# Patient Record
Sex: MALE | Race: WHITE | NOT HISPANIC OR LATINO | Employment: FULL TIME | URBAN - METROPOLITAN AREA
[De-identification: names, ages, dates, MRNs, and addresses within clinical notes are randomized per-mention and may not be internally consistent; named-entity substitution may affect disease eponyms.]

---

## 2018-03-22 LAB
ABSOL LYMPHOCYTES (HISTORICAL): 2.2 K/UL (ref 0.5–4)
ALBUMIN SERPL BCP-MCNC: 4.5 G/DL (ref 3–5.2)
ALP SERPL-CCNC: 62 U/L (ref 43–122)
ALT SERPL W P-5'-P-CCNC: 125 U/L (ref 9–52)
ANION GAP SERPL CALCULATED.3IONS-SCNC: 11 MMOL/L (ref 5–14)
AST SERPL W P-5'-P-CCNC: 58 U/L (ref 17–59)
BASOPHILS # BLD AUTO: 0 K/UL (ref 0–0.1)
BASOPHILS # BLD AUTO: 1 % (ref 0–1)
BILIRUB SERPL-MCNC: 1 MG/DL
BUN SERPL-MCNC: 15 MG/DL (ref 5–25)
CALCIUM SERPL-MCNC: 9.6 MG/DL (ref 8.4–10.2)
CHLORIDE SERPL-SCNC: 103 MEQ/L (ref 97–108)
CHOLEST SERPL-MCNC: 198 MG/DL
CHOLEST/HDLC SERPL: 5.1 {RATIO}
CO2 SERPL-SCNC: 29 MMOL/L (ref 22–30)
CREATINE, SERUM (HISTORICAL): 1 MG/DL (ref 0.7–1.5)
DEPRECATED RDW RBC AUTO: 14.4 %
EGFR (HISTORICAL): >60 ML/MIN/1.73 M2
EOSINOPHIL # BLD AUTO: 0.2 K/UL (ref 0–0.4)
EOSINOPHIL NFR BLD AUTO: 3 % (ref 0–6)
EST. AVERAGE GLUCOSE BLD GHB EST-MCNC: 151 MG/DL
GLUCOSE FASTING (HISTORICAL): 116 MG/DL (ref 70–99)
HBA1C MFR BLD HPLC: 6.9 %
HCT VFR BLD AUTO: 47.6 % (ref 41–53)
HDLC SERPL-MCNC: 39 MG/DL
HGB BLD-MCNC: 15.9 G/DL (ref 13.5–17.5)
LDL/HDL RATIO (HISTORICAL): 3.3
LDLC SERPL CALC-MCNC: 127 MG/DL
LYMPHOCYTES NFR BLD AUTO: 32 % (ref 25–45)
MCH RBC QN AUTO: 29.8 PG (ref 26–34)
MCHC RBC AUTO-ENTMCNC: 33.5 % (ref 31–36)
MCV RBC AUTO: 89 FL (ref 80–100)
MONOCYTES # BLD AUTO: 0.7 K/UL (ref 0.2–0.9)
MONOCYTES NFR BLD AUTO: 10 % (ref 1–10)
NEUTROPHILS ABS COUNT (HISTORICAL): 3.7 K/UL (ref 1.8–7.8)
NEUTS SEG NFR BLD AUTO: 54 % (ref 45–65)
PLATELET # BLD AUTO: 196 K/MCL (ref 150–450)
POTASSIUM SERPL-SCNC: 4.5 MEQ/L (ref 3.6–5)
RBC # BLD AUTO: 5.35 M/MCL (ref 4.5–5.9)
SODIUM SERPL-SCNC: 143 MEQ/L (ref 137–147)
TOTAL PROTEIN (HISTORICAL): 7.1 G/DL (ref 5.9–8.4)
TRIGL SERPL-MCNC: 159 MG/DL
TSH SERPL DL<=0.05 MIU/L-ACNC: 1.71 UIU/ML (ref 0.47–4.68)
VITAMIN D25-HYDROXY (HISTORICAL): 24.9 NG/ML (ref 30–100)
VLDLC SERPL CALC-MCNC: 32 MG/DL (ref 0–40)
WBC # BLD AUTO: 6.9 K/MCL (ref 4.5–11)

## 2018-08-15 ENCOUNTER — APPOINTMENT (OUTPATIENT)
Dept: LAB | Facility: IMAGING CENTER | Age: 43
End: 2018-08-15
Payer: COMMERCIAL

## 2018-08-15 ENCOUNTER — TRANSCRIBE ORDERS (OUTPATIENT)
Dept: ADMINISTRATIVE | Facility: HOSPITAL | Age: 43
End: 2018-08-15

## 2018-08-15 DIAGNOSIS — E78.2 MIXED HYPERLIPIDEMIA: ICD-10-CM

## 2018-08-15 DIAGNOSIS — E11.9 TYPE 2 DIABETES MELLITUS WITHOUT COMPLICATION, UNSPECIFIED WHETHER LONG TERM INSULIN USE (HCC): ICD-10-CM

## 2018-08-15 DIAGNOSIS — E11.9 TYPE 2 DIABETES MELLITUS WITHOUT COMPLICATION, UNSPECIFIED WHETHER LONG TERM INSULIN USE (HCC): Primary | ICD-10-CM

## 2018-08-15 LAB
ALBUMIN SERPL BCP-MCNC: 3.9 G/DL (ref 3.5–5)
ALP SERPL-CCNC: 64 U/L (ref 46–116)
ALT SERPL W P-5'-P-CCNC: 60 U/L (ref 12–78)
ANION GAP SERPL CALCULATED.3IONS-SCNC: 7 MMOL/L (ref 4–13)
AST SERPL W P-5'-P-CCNC: 25 U/L (ref 5–45)
BILIRUB SERPL-MCNC: 0.53 MG/DL (ref 0.2–1)
BUN SERPL-MCNC: 13 MG/DL (ref 5–25)
CALCIUM SERPL-MCNC: 9 MG/DL (ref 8.3–10.1)
CHLORIDE SERPL-SCNC: 107 MMOL/L (ref 100–108)
CHOLEST SERPL-MCNC: 194 MG/DL (ref 50–200)
CO2 SERPL-SCNC: 27 MMOL/L (ref 21–32)
CREAT SERPL-MCNC: 1.08 MG/DL (ref 0.6–1.3)
EST. AVERAGE GLUCOSE BLD GHB EST-MCNC: 131 MG/DL
GFR SERPL CREATININE-BSD FRML MDRD: 84 ML/MIN/1.73SQ M
GLUCOSE P FAST SERPL-MCNC: 105 MG/DL (ref 65–99)
HBA1C MFR BLD: 6.2 % (ref 4.2–6.3)
HDLC SERPL-MCNC: 37 MG/DL (ref 40–60)
LDLC SERPL CALC-MCNC: 123 MG/DL (ref 0–100)
POTASSIUM SERPL-SCNC: 4.4 MMOL/L (ref 3.5–5.3)
PROT SERPL-MCNC: 7.7 G/DL (ref 6.4–8.2)
SODIUM SERPL-SCNC: 141 MMOL/L (ref 136–145)
TRIGL SERPL-MCNC: 169 MG/DL

## 2018-08-15 PROCEDURE — 80061 LIPID PANEL: CPT

## 2018-08-15 PROCEDURE — 80053 COMPREHEN METABOLIC PANEL: CPT

## 2018-08-15 PROCEDURE — 36415 COLL VENOUS BLD VENIPUNCTURE: CPT

## 2018-08-15 PROCEDURE — 83036 HEMOGLOBIN GLYCOSYLATED A1C: CPT

## 2018-08-22 ENCOUNTER — TELEPHONE (OUTPATIENT)
Dept: FAMILY MEDICINE CLINIC | Facility: CLINIC | Age: 43
End: 2018-08-22

## 2018-08-23 ENCOUNTER — OFFICE VISIT (OUTPATIENT)
Dept: FAMILY MEDICINE CLINIC | Facility: CLINIC | Age: 43
End: 2018-08-23
Payer: COMMERCIAL

## 2018-08-23 VITALS
SYSTOLIC BLOOD PRESSURE: 130 MMHG | WEIGHT: 229.2 LBS | DIASTOLIC BLOOD PRESSURE: 92 MMHG | BODY MASS INDEX: 32.09 KG/M2 | HEART RATE: 76 BPM | HEIGHT: 71 IN | OXYGEN SATURATION: 98 % | RESPIRATION RATE: 16 BRPM | TEMPERATURE: 98.3 F

## 2018-08-23 DIAGNOSIS — J45.20 MILD INTERMITTENT ASTHMA WITHOUT COMPLICATION: ICD-10-CM

## 2018-08-23 DIAGNOSIS — R03.0 ELEVATED BLOOD PRESSURE READING IN OFFICE WITHOUT DIAGNOSIS OF HYPERTENSION: ICD-10-CM

## 2018-08-23 DIAGNOSIS — E78.2 MIXED HYPERLIPIDEMIA: ICD-10-CM

## 2018-08-23 DIAGNOSIS — E11.9 TYPE 2 DIABETES MELLITUS WITHOUT COMPLICATION, WITHOUT LONG-TERM CURRENT USE OF INSULIN (HCC): Primary | ICD-10-CM

## 2018-08-23 PROCEDURE — 3008F BODY MASS INDEX DOCD: CPT | Performed by: FAMILY MEDICINE

## 2018-08-23 PROCEDURE — 99214 OFFICE O/P EST MOD 30 MIN: CPT | Performed by: FAMILY MEDICINE

## 2018-08-23 RX ORDER — ALBUTEROL SULFATE 90 UG/1
2 AEROSOL, METERED RESPIRATORY (INHALATION) EVERY 6 HOURS PRN
Qty: 18 G | Refills: 0 | Status: SHIPPED | OUTPATIENT
Start: 2018-08-23 | End: 2018-08-23 | Stop reason: SDUPTHER

## 2018-08-23 RX ORDER — ROSUVASTATIN CALCIUM 10 MG/1
10 TABLET, COATED ORAL DAILY
Qty: 30 TABLET | Refills: 3 | Status: SHIPPED | OUTPATIENT
Start: 2018-08-23 | End: 2018-08-23 | Stop reason: SDUPTHER

## 2018-08-23 RX ORDER — METFORMIN HYDROCHLORIDE 500 MG/1
500 TABLET, EXTENDED RELEASE ORAL EVERY 24 HOURS
Qty: 30 TABLET | Refills: 0 | Status: SHIPPED | OUTPATIENT
Start: 2018-08-23 | End: 2018-10-01 | Stop reason: SDUPTHER

## 2018-08-23 RX ORDER — METFORMIN HYDROCHLORIDE 500 MG/1
500 TABLET, EXTENDED RELEASE ORAL EVERY 24 HOURS
Qty: 30 TABLET | Refills: 3 | Status: SHIPPED | OUTPATIENT
Start: 2018-08-23 | End: 2018-08-23 | Stop reason: SDUPTHER

## 2018-08-23 RX ORDER — ASPIRIN 81 MG/1
81 TABLET ORAL DAILY
COMMUNITY

## 2018-08-23 RX ORDER — ROSUVASTATIN CALCIUM 10 MG/1
10 TABLET, COATED ORAL DAILY
Qty: 30 TABLET | Refills: 0 | Status: SHIPPED | OUTPATIENT
Start: 2018-08-23 | End: 2018-10-23 | Stop reason: SDUPTHER

## 2018-08-23 RX ORDER — ALBUTEROL SULFATE 90 UG/1
2 AEROSOL, METERED RESPIRATORY (INHALATION) EVERY 6 HOURS PRN
Qty: 18 G | Refills: 0 | Status: SHIPPED | OUTPATIENT
Start: 2018-08-23 | End: 2018-09-25 | Stop reason: SDUPTHER

## 2018-08-23 NOTE — PROGRESS NOTES
Assessment/Plan:     Diagnoses and all orders for this visit:    Type 2 diabetes mellitus without complication, without long-term current use of insulin ([facility])  Comments:  Improved, continue same  We will continue to monitor  It was discussed about low carb diet  Orders:  -     Discontinue: metFORMIN (GLUCOPHAGE-XR) 500 mg 24 hr tablet; Take 1 tablet (500 mg total) by mouth every 24 hours  -     metFORMIN (GLUCOPHAGE-XR) 500 mg 24 hr tablet; Take 1 tablet (500 mg total) by mouth every 24 hours  -     Comprehensive metabolic panel; Future  -     Hemoglobin A1C; Future  -     Lipid Panel with Direct LDL reflex; Future    Mixed hyperlipidemia  Comments:  Not well controlled, I am going to start him on Crestor 10 mg 1 tablet daily  It was discussed about possible side effect of the medicine  Orders:  -     Discontinue: rosuvastatin (CRESTOR) 10 MG tablet; Take 1 tablet (10 mg total) by mouth daily  -     rosuvastatin (CRESTOR) 10 MG tablet; Take 1 tablet (10 mg total) by mouth daily  -     Comprehensive metabolic panel; Future  -     Lipid Panel with Direct LDL reflex; Future  -     TSH, 3rd generation with Free T4 reflex; Future    Mild intermittent asthma without complication  Comments:  Stable, he was given refills for albuterol  Orders:  -     Discontinue: albuterol (VENTOLIN HFA) 90 mcg/act inhaler; Inhale 2 puffs every 6 (six) hours as needed for wheezing  -     albuterol (VENTOLIN HFA) 90 mcg/act inhaler; Inhale 2 puffs every 6 (six) hours as needed for wheezing    Elevated blood pressure reading in office without diagnosis of hypertension  Comments:  He is to monitor his blood pressure at home and follow low-sodium diet  Other orders  -     aspirin (ECOTRIN LOW STRENGTH) 81 mg EC tablet; Take 81 mg by mouth daily          There are no Patient Instructions on file for this visit  Return in about 3 months (around 11/23/2018)  Subjective:      Patient ID: Clemmie Pallas is a 37 y o  male     Chief Complaint   Patient presents with    Diabetes     3 month f/u review labs       He is here today for follow-up multiple medical problems  His hemoglobin A1c improved  His cholesterol continues to be elevated  He has history of asthma but it has been stable and he does not use his inhaler usually      Diabetes   Pertinent negatives for hypoglycemia include no dizziness or headaches  Pertinent negatives for diabetes include no chest pain  The following portions of the patient's history were reviewed and updated as appropriate: allergies, current medications, past family history, past medical history, past social history, past surgical history and problem list     Review of Systems   Constitutional: Negative for chills and fever  HENT: Negative for trouble swallowing  Eyes: Negative for visual disturbance  Respiratory: Negative for cough and shortness of breath  Cardiovascular: Negative for chest pain and palpitations  Gastrointestinal: Negative for abdominal pain, blood in stool and vomiting  Endocrine: Negative for cold intolerance and heat intolerance  Genitourinary: Negative for difficulty urinating and dysuria  Musculoskeletal: Negative for gait problem  Skin: Negative for rash  Neurological: Negative for dizziness, syncope and headaches  Hematological: Negative for adenopathy  Psychiatric/Behavioral: Negative for behavioral problems           Current Outpatient Prescriptions   Medication Sig Dispense Refill    aspirin (ECOTRIN LOW STRENGTH) 81 mg EC tablet Take 81 mg by mouth daily      metFORMIN (GLUCOPHAGE-XR) 500 mg 24 hr tablet Take 1 tablet (500 mg total) by mouth every 24 hours 30 tablet 0    albuterol (VENTOLIN HFA) 90 mcg/act inhaler Inhale 2 puffs every 6 (six) hours as needed for wheezing 18 g 0    Omega-3 Fatty Acids (FISH OIL) 500 MG CAPS Take by mouth      rosuvastatin (CRESTOR) 10 MG tablet Take 1 tablet (10 mg total) by mouth daily 30 tablet 0 No current facility-administered medications for this visit  Objective:    /92 (BP Location: Left arm, Patient Position: Sitting, Cuff Size: Adult)   Pulse 76   Temp 98 3 °F (36 8 °C) (Tympanic)   Resp 16   Ht 5' 11" (1 803 m)   Wt 104 kg (229 lb 3 2 oz)   SpO2 98%   BMI 31 97 kg/m²        Physical Exam   Constitutional: He is oriented to person, place, and time  He appears well-developed and well-nourished  HENT:   Head: Normocephalic and atraumatic  Eyes: EOM are normal  Pupils are equal, round, and reactive to light  Neck: Normal range of motion  Neck supple  Cardiovascular: Normal rate, regular rhythm and normal heart sounds  Pulmonary/Chest: Effort normal and breath sounds normal    Abdominal: Soft  Bowel sounds are normal    Musculoskeletal: Normal range of motion  He exhibits no edema  Lymphadenopathy:     He has no cervical adenopathy  Neurological: He is alert and oriented to person, place, and time  No cranial nerve deficit  Skin: Skin is warm  No rash noted  Psychiatric: He has a normal mood and affect  Nursing note and vitals reviewed               Mica Boyd MD

## 2018-09-25 DIAGNOSIS — J45.20 MILD INTERMITTENT ASTHMA WITHOUT COMPLICATION: ICD-10-CM

## 2018-09-26 RX ORDER — ALBUTEROL SULFATE 90 UG/1
2 AEROSOL, METERED RESPIRATORY (INHALATION) EVERY 6 HOURS PRN
Qty: 18 G | Refills: 1 | Status: SHIPPED | OUTPATIENT
Start: 2018-09-26

## 2018-10-01 DIAGNOSIS — E11.9 TYPE 2 DIABETES MELLITUS WITHOUT COMPLICATION, WITHOUT LONG-TERM CURRENT USE OF INSULIN (HCC): ICD-10-CM

## 2018-10-01 RX ORDER — METFORMIN HYDROCHLORIDE 500 MG/1
TABLET, EXTENDED RELEASE ORAL
Qty: 30 TABLET | Refills: 0 | Status: SHIPPED | OUTPATIENT
Start: 2018-10-01 | End: 2018-10-26 | Stop reason: SDUPTHER

## 2018-10-02 ENCOUNTER — TELEPHONE (OUTPATIENT)
Dept: FAMILY MEDICINE CLINIC | Facility: CLINIC | Age: 43
End: 2018-10-02

## 2018-10-23 DIAGNOSIS — E78.2 MIXED HYPERLIPIDEMIA: ICD-10-CM

## 2018-10-23 RX ORDER — ROSUVASTATIN CALCIUM 10 MG/1
10 TABLET, COATED ORAL DAILY
Qty: 30 TABLET | Refills: 0 | Status: SHIPPED | OUTPATIENT
Start: 2018-10-23 | End: 2018-10-24 | Stop reason: SDUPTHER

## 2018-10-24 DIAGNOSIS — E78.2 MIXED HYPERLIPIDEMIA: ICD-10-CM

## 2018-10-24 RX ORDER — ROSUVASTATIN CALCIUM 10 MG/1
10 TABLET, COATED ORAL DAILY
Qty: 30 TABLET | Refills: 0 | Status: SHIPPED | OUTPATIENT
Start: 2018-10-24 | End: 2018-11-26 | Stop reason: SDUPTHER

## 2018-10-26 DIAGNOSIS — E11.9 TYPE 2 DIABETES MELLITUS WITHOUT COMPLICATION, WITHOUT LONG-TERM CURRENT USE OF INSULIN (HCC): ICD-10-CM

## 2018-10-28 RX ORDER — METFORMIN HYDROCHLORIDE 500 MG/1
500 TABLET, EXTENDED RELEASE ORAL DAILY
Qty: 90 TABLET | Refills: 0 | Status: SHIPPED | OUTPATIENT
Start: 2018-10-28 | End: 2018-11-26 | Stop reason: SDUPTHER

## 2018-11-17 ENCOUNTER — TRANSCRIBE ORDERS (OUTPATIENT)
Dept: ADMINISTRATIVE | Facility: HOSPITAL | Age: 43
End: 2018-11-17

## 2018-11-17 ENCOUNTER — APPOINTMENT (OUTPATIENT)
Dept: LAB | Facility: IMAGING CENTER | Age: 43
End: 2018-11-17
Payer: COMMERCIAL

## 2018-11-17 DIAGNOSIS — E11.9 TYPE 2 DIABETES MELLITUS WITHOUT COMPLICATION, WITHOUT LONG-TERM CURRENT USE OF INSULIN (HCC): ICD-10-CM

## 2018-11-17 DIAGNOSIS — E78.2 MIXED HYPERLIPIDEMIA: ICD-10-CM

## 2018-11-17 LAB
EST. AVERAGE GLUCOSE BLD GHB EST-MCNC: 143 MG/DL
HBA1C MFR BLD: 6.6 % (ref 4.2–6.3)

## 2018-11-17 PROCEDURE — 80053 COMPREHEN METABOLIC PANEL: CPT

## 2018-11-17 PROCEDURE — 80061 LIPID PANEL: CPT

## 2018-11-17 PROCEDURE — 84443 ASSAY THYROID STIM HORMONE: CPT

## 2018-11-17 PROCEDURE — 36415 COLL VENOUS BLD VENIPUNCTURE: CPT

## 2018-11-17 PROCEDURE — 83036 HEMOGLOBIN GLYCOSYLATED A1C: CPT

## 2018-11-18 LAB
ALBUMIN SERPL BCP-MCNC: 4 G/DL (ref 3.5–5)
ALP SERPL-CCNC: 69 U/L (ref 46–116)
ALT SERPL W P-5'-P-CCNC: 49 U/L (ref 12–78)
ANION GAP SERPL CALCULATED.3IONS-SCNC: 4 MMOL/L (ref 4–13)
AST SERPL W P-5'-P-CCNC: 26 U/L (ref 5–45)
BILIRUB SERPL-MCNC: 0.72 MG/DL (ref 0.2–1)
BUN SERPL-MCNC: 14 MG/DL (ref 5–25)
CALCIUM SERPL-MCNC: 8.2 MG/DL (ref 8.3–10.1)
CHLORIDE SERPL-SCNC: 105 MMOL/L (ref 100–108)
CHOLEST SERPL-MCNC: 127 MG/DL (ref 50–200)
CO2 SERPL-SCNC: 28 MMOL/L (ref 21–32)
CREAT SERPL-MCNC: 0.97 MG/DL (ref 0.6–1.3)
GFR SERPL CREATININE-BSD FRML MDRD: 95 ML/MIN/1.73SQ M
GLUCOSE P FAST SERPL-MCNC: 105 MG/DL (ref 65–99)
HDLC SERPL-MCNC: 37 MG/DL (ref 40–60)
LDLC SERPL CALC-MCNC: 65 MG/DL (ref 0–100)
POTASSIUM SERPL-SCNC: 3.9 MMOL/L (ref 3.5–5.3)
PROT SERPL-MCNC: 7.2 G/DL (ref 6.4–8.2)
SODIUM SERPL-SCNC: 137 MMOL/L (ref 136–145)
TRIGL SERPL-MCNC: 125 MG/DL
TSH SERPL DL<=0.05 MIU/L-ACNC: 1.14 UIU/ML (ref 0.36–3.74)

## 2018-11-26 ENCOUNTER — OFFICE VISIT (OUTPATIENT)
Dept: FAMILY MEDICINE CLINIC | Facility: CLINIC | Age: 43
End: 2018-11-26
Payer: COMMERCIAL

## 2018-11-26 VITALS
HEART RATE: 76 BPM | HEIGHT: 71 IN | OXYGEN SATURATION: 97 % | SYSTOLIC BLOOD PRESSURE: 130 MMHG | WEIGHT: 228.6 LBS | TEMPERATURE: 97.6 F | RESPIRATION RATE: 16 BRPM | DIASTOLIC BLOOD PRESSURE: 90 MMHG | BODY MASS INDEX: 32 KG/M2

## 2018-11-26 DIAGNOSIS — E55.9 VITAMIN D INSUFFICIENCY: ICD-10-CM

## 2018-11-26 DIAGNOSIS — E78.2 MIXED HYPERLIPIDEMIA: ICD-10-CM

## 2018-11-26 DIAGNOSIS — E11.9 TYPE 2 DIABETES MELLITUS WITHOUT COMPLICATION, WITHOUT LONG-TERM CURRENT USE OF INSULIN (HCC): ICD-10-CM

## 2018-11-26 DIAGNOSIS — I10 ESSENTIAL HYPERTENSION: ICD-10-CM

## 2018-11-26 DIAGNOSIS — E11.9 TYPE 2 DIABETES MELLITUS WITHOUT COMPLICATION, WITHOUT LONG-TERM CURRENT USE OF INSULIN (HCC): Primary | ICD-10-CM

## 2018-11-26 DIAGNOSIS — E66.9 CLASS 1 OBESITY WITH BODY MASS INDEX (BMI) OF 31.0 TO 31.9 IN ADULT, UNSPECIFIED OBESITY TYPE, UNSPECIFIED WHETHER SERIOUS COMORBIDITY PRESENT: ICD-10-CM

## 2018-11-26 PROCEDURE — 1036F TOBACCO NON-USER: CPT | Performed by: FAMILY MEDICINE

## 2018-11-26 PROCEDURE — 3008F BODY MASS INDEX DOCD: CPT | Performed by: FAMILY MEDICINE

## 2018-11-26 PROCEDURE — 99214 OFFICE O/P EST MOD 30 MIN: CPT | Performed by: FAMILY MEDICINE

## 2018-11-26 RX ORDER — METFORMIN HYDROCHLORIDE 500 MG/1
500 TABLET, EXTENDED RELEASE ORAL DAILY
Qty: 90 TABLET | Refills: 1 | Status: SHIPPED | OUTPATIENT
Start: 2018-11-26 | End: 2019-07-11 | Stop reason: SDUPTHER

## 2018-11-26 RX ORDER — ROSUVASTATIN CALCIUM 10 MG/1
10 TABLET, COATED ORAL DAILY
Qty: 90 TABLET | Refills: 1 | Status: SHIPPED | OUTPATIENT
Start: 2018-11-26 | End: 2019-06-09 | Stop reason: SDUPTHER

## 2018-11-26 NOTE — PROGRESS NOTES
Assessment/Plan:     Diagnoses and all orders for this visit:    Type 2 diabetes mellitus without complication, without long-term current use of insulin ([facility])  Comments:  Stable  Continue same  We will continue to monitor  Orders:  -     metFORMIN (GLUCOPHAGE-XR) 500 mg 24 hr tablet; Take 1 tablet (500 mg total) by mouth daily  -     Hemoglobin A1C; Future    Mixed hyperlipidemia  Comments:  Improved  Continue on Crestor  Will continue to monitor  Orders:  -     rosuvastatin (CRESTOR) 10 MG tablet; Take 1 tablet (10 mg total) by mouth daily  -     Lipid Panel with Direct LDL reflex; Future    Class 1 obesity with body mass index (BMI) of 31 0 to 31 9 in adult, unspecified obesity type, unspecified whether serious comorbidity present  Comments: It was discussed about low carb diet and regular exercise  Vitamin D insufficiency  Comments:  He was told to take vitamin-D 1000 units daily  Will continue to monitor  Orders:  -     Vitamin D 25 hydroxy; Future    Type 2 diabetes mellitus without complication, without long-term current use of insulin ([facility] [address] )  Comments:  Stable continue same  Orders:  -     metFORMIN (GLUCOPHAGE-XR) 500 mg 24 hr tablet; Take 1 tablet (500 mg total) by mouth daily  -     Hemoglobin A1C; Future    Mixed hyperlipidemia  -     rosuvastatin (CRESTOR) 10 MG tablet; Take 1 tablet (10 mg total) by mouth daily  -     Lipid Panel with Direct LDL reflex; Future    Essential hypertension  Comments:  Not well controlled  He is to monitor his blood pressure at home  I would consider lisinopril if it continues to be elevated  Orders:  -     CBC and differential; Future  -     Comprehensive metabolic panel; Future  -     TSH, 3rd generation with Free T4 reflex; Future          There are no Patient Instructions on file for this visit  Return in about 6 months (around 5/26/2019)  Subjective:      Patient ID: Kourtney Blake is a 37 y o  male      Chief Complaint   Patient presents with  Follow-up     lab results       Jill Otero is a 58-year-old male with significant past medical history of HLD and type 2 DM presenting today for routine 3-month followup  Patient states he feels well overall and does not have any major concerns or complaints  Patient has been compliant with all of his medication and denies any side effects  He had a chance to quickly glance over his blood work results and was pleased with the improvement in his cholesterol  Patient states he is working on diet modification and incorporating exercise  He would like not to increase me        The following portions of the patient's history were reviewed and updated as appropriate: allergies, current medications, past family history, past medical history, past social history, past surgical history and problem list     Review of Systems      Current Outpatient Prescriptions   Medication Sig Dispense Refill    albuterol (VENTOLIN HFA) 90 mcg/act inhaler Inhale 2 puffs every 6 (six) hours as needed for wheezing 18 g 1    aspirin (ECOTRIN LOW STRENGTH) 81 mg EC tablet Take 81 mg by mouth daily      metFORMIN (GLUCOPHAGE-XR) 500 mg 24 hr tablet Take 1 tablet (500 mg total) by mouth daily 90 tablet 1    rosuvastatin (CRESTOR) 10 MG tablet Take 1 tablet (10 mg total) by mouth daily 90 tablet 1    Omega-3 Fatty Acids (FISH OIL) 500 MG CAPS Take by mouth       No current facility-administered medications for this visit          Objective:    /90 (BP Location: Left arm, Patient Position: Sitting, Cuff Size: Adult)   Pulse 76   Temp 97 6 °F (36 4 °C) (Tympanic)   Resp 16   Ht 5' 11" (1 803 m)   Wt 104 kg (228 lb 9 6 oz)   SpO2 97%   BMI 31 88 kg/m²        Physical Exam           Daylin Duron MD

## 2019-06-09 DIAGNOSIS — E78.2 MIXED HYPERLIPIDEMIA: ICD-10-CM

## 2019-06-09 RX ORDER — ROSUVASTATIN CALCIUM 10 MG/1
TABLET, COATED ORAL
Qty: 90 TABLET | Refills: 1 | Status: SHIPPED | OUTPATIENT
Start: 2019-06-09 | End: 2019-12-03 | Stop reason: SDUPTHER

## 2019-07-09 ENCOUNTER — APPOINTMENT (OUTPATIENT)
Dept: LAB | Facility: IMAGING CENTER | Age: 44
End: 2019-07-09
Payer: COMMERCIAL

## 2019-07-09 ENCOUNTER — TRANSCRIBE ORDERS (OUTPATIENT)
Dept: ADMINISTRATIVE | Facility: HOSPITAL | Age: 44
End: 2019-07-09

## 2019-07-09 DIAGNOSIS — I10 ESSENTIAL HYPERTENSION: ICD-10-CM

## 2019-07-09 DIAGNOSIS — E78.2 MIXED HYPERLIPIDEMIA: ICD-10-CM

## 2019-07-09 DIAGNOSIS — E11.9 TYPE 2 DIABETES MELLITUS WITHOUT COMPLICATION, WITHOUT LONG-TERM CURRENT USE OF INSULIN (HCC): ICD-10-CM

## 2019-07-09 DIAGNOSIS — E55.9 VITAMIN D INSUFFICIENCY: ICD-10-CM

## 2019-07-09 LAB
25(OH)D3 SERPL-MCNC: 32.1 NG/ML (ref 30–100)
ALBUMIN SERPL BCP-MCNC: 4 G/DL (ref 3.5–5)
ALP SERPL-CCNC: 71 U/L (ref 46–116)
ALT SERPL W P-5'-P-CCNC: 64 U/L (ref 12–78)
ANION GAP SERPL CALCULATED.3IONS-SCNC: 7 MMOL/L (ref 4–13)
AST SERPL W P-5'-P-CCNC: 25 U/L (ref 5–45)
BASOPHILS # BLD AUTO: 0.05 THOUSANDS/ΜL (ref 0–0.1)
BASOPHILS NFR BLD AUTO: 1 % (ref 0–1)
BILIRUB SERPL-MCNC: 0.42 MG/DL (ref 0.2–1)
BUN SERPL-MCNC: 13 MG/DL (ref 5–25)
CALCIUM SERPL-MCNC: 8.9 MG/DL (ref 8.3–10.1)
CHLORIDE SERPL-SCNC: 106 MMOL/L (ref 100–108)
CHOLEST SERPL-MCNC: 132 MG/DL (ref 50–200)
CO2 SERPL-SCNC: 25 MMOL/L (ref 21–32)
CREAT SERPL-MCNC: 0.92 MG/DL (ref 0.6–1.3)
EOSINOPHIL # BLD AUTO: 0.24 THOUSAND/ΜL (ref 0–0.61)
EOSINOPHIL NFR BLD AUTO: 4 % (ref 0–6)
ERYTHROCYTE [DISTWIDTH] IN BLOOD BY AUTOMATED COUNT: 13.3 % (ref 11.6–15.1)
EST. AVERAGE GLUCOSE BLD GHB EST-MCNC: 160 MG/DL
GFR SERPL CREATININE-BSD FRML MDRD: 102 ML/MIN/1.73SQ M
GLUCOSE P FAST SERPL-MCNC: 133 MG/DL (ref 65–99)
HBA1C MFR BLD: 7.2 % (ref 4.2–6.3)
HCT VFR BLD AUTO: 45.6 % (ref 36.5–49.3)
HDLC SERPL-MCNC: 44 MG/DL (ref 40–60)
HGB BLD-MCNC: 15 G/DL (ref 12–17)
IMM GRANULOCYTES # BLD AUTO: 0.02 THOUSAND/UL (ref 0–0.2)
IMM GRANULOCYTES NFR BLD AUTO: 0 % (ref 0–2)
LDLC SERPL CALC-MCNC: 54 MG/DL (ref 0–100)
LYMPHOCYTES # BLD AUTO: 1.99 THOUSANDS/ΜL (ref 0.6–4.47)
LYMPHOCYTES NFR BLD AUTO: 30 % (ref 14–44)
MCH RBC QN AUTO: 30.2 PG (ref 26.8–34.3)
MCHC RBC AUTO-ENTMCNC: 32.9 G/DL (ref 31.4–37.4)
MCV RBC AUTO: 92 FL (ref 82–98)
MONOCYTES # BLD AUTO: 0.57 THOUSAND/ΜL (ref 0.17–1.22)
MONOCYTES NFR BLD AUTO: 9 % (ref 4–12)
NEUTROPHILS # BLD AUTO: 3.77 THOUSANDS/ΜL (ref 1.85–7.62)
NEUTS SEG NFR BLD AUTO: 56 % (ref 43–75)
NRBC BLD AUTO-RTO: 0 /100 WBCS
PLATELET # BLD AUTO: 198 THOUSANDS/UL (ref 149–390)
PMV BLD AUTO: 11.3 FL (ref 8.9–12.7)
POTASSIUM SERPL-SCNC: 4 MMOL/L (ref 3.5–5.3)
PROT SERPL-MCNC: 7.2 G/DL (ref 6.4–8.2)
RBC # BLD AUTO: 4.96 MILLION/UL (ref 3.88–5.62)
SODIUM SERPL-SCNC: 138 MMOL/L (ref 136–145)
TRIGL SERPL-MCNC: 169 MG/DL
TSH SERPL DL<=0.05 MIU/L-ACNC: 1.3 UIU/ML (ref 0.36–3.74)
WBC # BLD AUTO: 6.64 THOUSAND/UL (ref 4.31–10.16)

## 2019-07-09 PROCEDURE — 80061 LIPID PANEL: CPT

## 2019-07-09 PROCEDURE — 36415 COLL VENOUS BLD VENIPUNCTURE: CPT

## 2019-07-09 PROCEDURE — 85025 COMPLETE CBC W/AUTO DIFF WBC: CPT

## 2019-07-09 PROCEDURE — 83036 HEMOGLOBIN GLYCOSYLATED A1C: CPT

## 2019-07-09 PROCEDURE — 80053 COMPREHEN METABOLIC PANEL: CPT

## 2019-07-09 PROCEDURE — 84443 ASSAY THYROID STIM HORMONE: CPT

## 2019-07-09 PROCEDURE — 82306 VITAMIN D 25 HYDROXY: CPT

## 2019-07-11 ENCOUNTER — OFFICE VISIT (OUTPATIENT)
Dept: FAMILY MEDICINE CLINIC | Facility: CLINIC | Age: 44
End: 2019-07-11
Payer: COMMERCIAL

## 2019-07-11 VITALS
SYSTOLIC BLOOD PRESSURE: 130 MMHG | BODY MASS INDEX: 32.73 KG/M2 | OXYGEN SATURATION: 98 % | HEIGHT: 71 IN | DIASTOLIC BLOOD PRESSURE: 84 MMHG | RESPIRATION RATE: 16 BRPM | HEART RATE: 88 BPM | WEIGHT: 233.8 LBS | TEMPERATURE: 99.3 F

## 2019-07-11 DIAGNOSIS — E78.2 MIXED HYPERLIPIDEMIA: ICD-10-CM

## 2019-07-11 DIAGNOSIS — E11.9 TYPE 2 DIABETES MELLITUS WITHOUT COMPLICATION, WITHOUT LONG-TERM CURRENT USE OF INSULIN (HCC): Primary | ICD-10-CM

## 2019-07-11 DIAGNOSIS — J45.20 MILD INTERMITTENT ASTHMA WITHOUT COMPLICATION: ICD-10-CM

## 2019-07-11 DIAGNOSIS — E11.9 TYPE 2 DIABETES MELLITUS WITHOUT COMPLICATION, WITHOUT LONG-TERM CURRENT USE OF INSULIN (HCC): ICD-10-CM

## 2019-07-11 LAB
CREAT UR-MCNC: 94.5 MG/DL
MICROALBUMIN UR-MCNC: 5.9 MG/L (ref 0–20)
MICROALBUMIN/CREAT 24H UR: 6 MG/G CREATININE (ref 0–30)

## 2019-07-11 PROCEDURE — 1036F TOBACCO NON-USER: CPT | Performed by: FAMILY MEDICINE

## 2019-07-11 PROCEDURE — 3008F BODY MASS INDEX DOCD: CPT | Performed by: FAMILY MEDICINE

## 2019-07-11 PROCEDURE — 82043 UR ALBUMIN QUANTITATIVE: CPT | Performed by: FAMILY MEDICINE

## 2019-07-11 PROCEDURE — 3061F NEG MICROALBUMINURIA REV: CPT | Performed by: FAMILY MEDICINE

## 2019-07-11 PROCEDURE — 99214 OFFICE O/P EST MOD 30 MIN: CPT | Performed by: FAMILY MEDICINE

## 2019-07-11 PROCEDURE — 82570 ASSAY OF URINE CREATININE: CPT | Performed by: FAMILY MEDICINE

## 2019-07-11 RX ORDER — ACETAMINOPHEN 160 MG
2000 TABLET,DISINTEGRATING ORAL DAILY
COMMUNITY

## 2019-07-11 RX ORDER — METFORMIN HYDROCHLORIDE 500 MG/1
1000 TABLET, EXTENDED RELEASE ORAL DAILY
Qty: 180 TABLET | Refills: 1 | Status: SHIPPED | OUTPATIENT
Start: 2019-07-11 | End: 2019-08-20 | Stop reason: SDUPTHER

## 2019-07-11 NOTE — PROGRESS NOTES
Assessment/Plan:      Diagnoses and all orders for this visit:    Type 2 diabetes mellitus without complication, without long-term current use of insulin ([facility])  Comments:  Not well controlled  I am going to increase his metformin to 500 mg 2 tablets daily  It was discussed about side effects  Was discussed about low carb diet  Orders:  -     Microalbumin / creatinine urine ratio  -     metFORMIN (GLUCOPHAGE-XR) 500 mg 24 hr tablet; Take 2 tablets (1,000 mg total) by mouth daily  -     Hemoglobin A1C; Future  -     Comprehensive metabolic panel; Future  -     Lipid Panel with Direct LDL reflex; Future    Mixed hyperlipidemia  Comments:  Not well controlled  It was discussed about low carb and low-fat diet  Continue same  Mild intermittent asthma without complication  Comments:  Stable  Continue same  Will continue to monitor  Type 2 diabetes mellitus without complication, without long-term current use of insulin ([facility])  -     Microalbumin / creatinine urine ratio  -     metFORMIN (GLUCOPHAGE-XR) 500 mg 24 hr tablet; Take 2 tablets (1,000 mg total) by mouth daily  -     Hemoglobin A1C; Future  -     Comprehensive metabolic panel; Future  -     Lipid Panel with Direct LDL reflex; Future    Other orders  -     Cholecalciferol (VITAMIN D3) 2000 units capsule; Take 2,000 Units by mouth daily          Subjective:     Patient ID: Manjeet Minor is a 37 y o  male  Pt states that he has had trouble losing weight  Stopped drinking soda, avoiding sugar  He is eating a lot of vegetables  He has had difficulty avoiding carbs  It has been difficult for him to exercise between work and taking care of his kid  He is taking Metformin daily  Not monitoring sugars  Review of Systems   Constitutional: Negative for chills and fever  HENT: Negative for trouble swallowing  Eyes: Negative for visual disturbance  Respiratory: Negative for cough and shortness of breath      Cardiovascular: Negative for chest pain and palpitations  Gastrointestinal: Negative for abdominal pain, blood in stool and vomiting  Endocrine: Negative for cold intolerance and heat intolerance  Genitourinary: Negative for difficulty urinating and dysuria  Musculoskeletal: Negative for gait problem  Skin: Negative for rash  Neurological: Negative for dizziness, syncope and headaches  Hematological: Negative for adenopathy  Psychiatric/Behavioral: Negative for behavioral problems  Objective:     Physical Exam   Constitutional: He is oriented to person, place, and time  He appears well-developed and well-nourished  HENT:   Head: Normocephalic and atraumatic  Eyes: Pupils are equal, round, and reactive to light  EOM are normal    Neck: Normal range of motion  Neck supple  Cardiovascular: Normal rate, regular rhythm, normal heart sounds and intact distal pulses  Pulmonary/Chest: Effort normal and breath sounds normal    Abdominal: Soft  Bowel sounds are normal    Musculoskeletal: Normal range of motion  He exhibits no edema  Lymphadenopathy:     He has no cervical adenopathy  Neurological: He is alert and oriented to person, place, and time  No cranial nerve deficit  Skin: Skin is warm  No rash noted  Psychiatric: He has a normal mood and affect  Nursing note and vitals reviewed  BMI Counseling: Body mass index is 32 61 kg/m²  Discussed the patient's BMI with him  The BMI is above average  BMI counseling and education was provided to the patient  Nutrition recommendations include reducing portion sizes, decreasing overall calorie intake and 3-5 servings of fruits/vegetables daily  Exercise recommendations include moderate aerobic physical activity for 150 minutes/week

## 2019-07-11 NOTE — PROGRESS NOTES
Diabetic Foot Exam    Patient's shoes and socks removed  Right Foot/Ankle   Right Foot Inspection  Skin Exam: skin normal and skin intact no dry skin, no warmth, no callus, no erythema, no maceration, no abnormal color, no pre-ulcer, no ulcer and no callus                          Toe Exam: ROM and strength within normal limits  Sensory       Monofilament testing: intact  Vascular  Capillary refills: < 3 seconds  The right DP pulse is 2+  Left Foot/Ankle  Left Foot Inspection  Skin Exam: skin normal and skin intactno dry skin, no warmth, no erythema, no maceration, normal color, no pre-ulcer, no ulcer and no callus                         Toe Exam: ROM and strength within normal limits                   Sensory       Monofilament: intact  Vascular  Capillary refills: < 3 seconds  The left DP pulse is 2+  Assign Risk Category:  No deformity present; No loss of protective sensation;  No weak pulses       Risk: 0

## 2019-07-11 NOTE — PATIENT INSTRUCTIONS
Obesity   AMBULATORY CARE:   Obesity  is when your body mass index (BMI) is greater than 30  Your healthcare provider will use your height and weight to measure your BMI  The risks of obesity include  many health problems, such as injuries or physical disability  You may need tests to check for the following:  · Diabetes     · High blood pressure or high cholesterol     · Heart disease     · Gallbladder or liver disease     · Cancer of the colon, breast, prostate, liver, or kidney     · Sleep apnea     · Arthritis or gout  Seek care immediately if:   · You have a severe headache, confusion, or difficulty speaking  · You have weakness on one side of your body  · You have chest pain, sweating, or shortness of breath  Contact your healthcare provider if:   · You have symptoms of gallbladder or liver disease, such as pain in your upper abdomen  · You have knee or hip pain and discomfort while walking  · You have symptoms of diabetes, such as intense hunger and thirst, and frequent urination  · You have symptoms of sleep apnea, such as snoring or daytime sleepiness  · You have questions or concerns about your condition or care  Treatment for obesity  focuses on helping you lose weight to improve your health  Even a small decrease in BMI can reduce the risk for many health problems  Your healthcare provider will help you set a weight-loss goal   · Lifestyle changes  are the first step in treating obesity  These include making healthy food choices and getting regular physical activity  Your healthcare provider may suggest a weight-loss program that involves coaching, education, and therapy  · Medicine  may help you lose weight when it is used with a healthy diet and physical activity  · Surgery  can help you lose weight if you are very obese and have other health problems  There are several types of weight-loss surgery  Ask your healthcare provider for more information    Be successful losing weight:   · Set small, realistic goals  An example of a small goal is to walk for 20 minutes 5 days a week  Anther goal is to lose 5% of your body weight  · Tell friends, family members, and coworkers about your goals  and ask for their support  Ask a friend to lose weight with you, or join a weight-loss support group  · Identify foods or triggers that may cause you to overeat , and find ways to avoid them  Remove tempting high-calorie foods from your home and workplace  Place a bowl of fresh fruit on your kitchen counter  If stress causes you to eat, then find other ways to cope with stress  · Keep a diary to track what you eat and drink  Also write down how many minutes of physical activity you do each day  Weigh yourself once a week and record it in your diary  Eating changes: You will need to eat 500 to 1,000 fewer calories each day than you currently eat to lose 1 to 2 pounds a week  The following changes will help you cut calories:  · Eat smaller portions  Use small plates, no larger than 9 inches in diameter  Fill your plate half full of fruits and vegetables  Measure your food using measuring cups until you know what a serving size looks like  · Eat 3 meals and 1 or 2 snacks each day  Plan your meals in advance  Radha Keep and eat at home most of the time  Eat slowly  · Eat fruits and vegetables at every meal   They are low in calories and high in fiber, which makes you feel full  Do not add butter, margarine, or cream sauce to vegetables  Use herbs to season steamed vegetables  · Eat less fat and fewer fried foods  Eat more baked or grilled chicken and fish  These protein sources are lower in calories and fat than red meat  Limit fast food  Dress your salads with olive oil and vinegar instead of bottled dressing  · Limit the amount of sugar you eat  Do not drink sugary beverages  Limit alcohol  Activity changes:  Physical activity is good for your body in many ways   It helps you burn calories and build strong muscles  It decreases stress and depression, and improves your mood  It can also help you sleep better  Talk to your healthcare provider before you begin an exercise program   · Exercise for at least 30 minutes 5 days a week  Start slowly  Set aside time each day for physical activity that you enjoy and that is convenient for you  It is best to do both weight training and an activity that increases your heart rate, such as walking, bicycling, or swimming  · Find ways to be more active  Do yard work and housecleaning  Walk up the stairs instead of using elevators  Spend your leisure time going to events that require walking, such as outdoor festivals or fairs  This extra physical activity can help you lose weight and keep it off  Follow up with your healthcare provider as directed: You may need to meet with a dietitian  Write down your questions so you remember to ask them during your visits  © 2017 [employer] Information is for End User's use only and may not be sold, redistributed or otherwise used for commercial purposes  All illustrations and images included in CareNotes® are the copyrighted property of [employer]  or [employer]  The above information is an  only  It is not intended as medical advice for individual conditions or treatments  Talk to your doctor, nurse or pharmacist before following any medical regimen to see if it is safe and effective for you

## 2019-08-20 DIAGNOSIS — E11.9 TYPE 2 DIABETES MELLITUS WITHOUT COMPLICATION, WITHOUT LONG-TERM CURRENT USE OF INSULIN (HCC): ICD-10-CM

## 2019-08-20 RX ORDER — METFORMIN HYDROCHLORIDE 500 MG/1
TABLET, EXTENDED RELEASE ORAL
Qty: 90 TABLET | Refills: 0 | Status: SHIPPED | OUTPATIENT
Start: 2019-08-20 | End: 2019-12-11 | Stop reason: SDUPTHER

## 2019-10-29 ENCOUNTER — APPOINTMENT (OUTPATIENT)
Dept: LAB | Facility: IMAGING CENTER | Age: 44
End: 2019-10-29
Payer: COMMERCIAL

## 2019-10-29 ENCOUNTER — TRANSCRIBE ORDERS (OUTPATIENT)
Dept: ADMINISTRATIVE | Facility: HOSPITAL | Age: 44
End: 2019-10-29

## 2019-10-29 DIAGNOSIS — E11.9 TYPE 2 DIABETES MELLITUS WITHOUT COMPLICATION, WITHOUT LONG-TERM CURRENT USE OF INSULIN (HCC): ICD-10-CM

## 2019-10-29 LAB
ALBUMIN SERPL BCP-MCNC: 4.6 G/DL (ref 3.5–5)
ALP SERPL-CCNC: 73 U/L (ref 46–116)
ALT SERPL W P-5'-P-CCNC: 77 U/L (ref 12–78)
ANION GAP SERPL CALCULATED.3IONS-SCNC: 6 MMOL/L (ref 4–13)
AST SERPL W P-5'-P-CCNC: 32 U/L (ref 5–45)
BILIRUB SERPL-MCNC: 0.61 MG/DL (ref 0.2–1)
BUN SERPL-MCNC: 11 MG/DL (ref 5–25)
CALCIUM SERPL-MCNC: 9.1 MG/DL (ref 8.3–10.1)
CHLORIDE SERPL-SCNC: 106 MMOL/L (ref 100–108)
CHOLEST SERPL-MCNC: 131 MG/DL (ref 50–200)
CO2 SERPL-SCNC: 28 MMOL/L (ref 21–32)
CREAT SERPL-MCNC: 1.01 MG/DL (ref 0.6–1.3)
EST. AVERAGE GLUCOSE BLD GHB EST-MCNC: 154 MG/DL
GFR SERPL CREATININE-BSD FRML MDRD: 90 ML/MIN/1.73SQ M
GLUCOSE P FAST SERPL-MCNC: 101 MG/DL (ref 65–99)
HBA1C MFR BLD: 7 % (ref 4.2–6.3)
HDLC SERPL-MCNC: 36 MG/DL
LDLC SERPL CALC-MCNC: 55 MG/DL (ref 0–100)
POTASSIUM SERPL-SCNC: 4.2 MMOL/L (ref 3.5–5.3)
PROT SERPL-MCNC: 7.5 G/DL (ref 6.4–8.2)
SODIUM SERPL-SCNC: 140 MMOL/L (ref 136–145)
TRIGL SERPL-MCNC: 202 MG/DL

## 2019-10-29 PROCEDURE — 80053 COMPREHEN METABOLIC PANEL: CPT

## 2019-10-29 PROCEDURE — 83036 HEMOGLOBIN GLYCOSYLATED A1C: CPT

## 2019-10-29 PROCEDURE — 36415 COLL VENOUS BLD VENIPUNCTURE: CPT

## 2019-10-29 PROCEDURE — 80061 LIPID PANEL: CPT

## 2019-11-04 ENCOUNTER — OFFICE VISIT (OUTPATIENT)
Dept: FAMILY MEDICINE CLINIC | Facility: CLINIC | Age: 44
End: 2019-11-04
Payer: COMMERCIAL

## 2019-11-04 VITALS
HEIGHT: 71 IN | SYSTOLIC BLOOD PRESSURE: 126 MMHG | OXYGEN SATURATION: 97 % | HEART RATE: 86 BPM | TEMPERATURE: 97.9 F | DIASTOLIC BLOOD PRESSURE: 80 MMHG | WEIGHT: 231.6 LBS | RESPIRATION RATE: 16 BRPM | BODY MASS INDEX: 32.42 KG/M2

## 2019-11-04 DIAGNOSIS — J45.20 MILD INTERMITTENT ASTHMA WITHOUT COMPLICATION: ICD-10-CM

## 2019-11-04 DIAGNOSIS — R03.0 ELEVATED BLOOD PRESSURE READING IN OFFICE WITHOUT DIAGNOSIS OF HYPERTENSION: ICD-10-CM

## 2019-11-04 DIAGNOSIS — E78.2 MIXED HYPERLIPIDEMIA: ICD-10-CM

## 2019-11-04 DIAGNOSIS — E11.9 TYPE 2 DIABETES MELLITUS WITHOUT COMPLICATION, WITHOUT LONG-TERM CURRENT USE OF INSULIN (HCC): Primary | Chronic | ICD-10-CM

## 2019-11-04 PROCEDURE — 99214 OFFICE O/P EST MOD 30 MIN: CPT | Performed by: FAMILY MEDICINE

## 2019-11-04 PROCEDURE — 3008F BODY MASS INDEX DOCD: CPT | Performed by: FAMILY MEDICINE

## 2019-11-04 NOTE — PROGRESS NOTES
Assessment/Plan:         Diagnoses and all orders for this visit:    Type 2 diabetes mellitus without complication, without long-term current use of insulin ([facility])  Comments:  Fairly controlled, A1c 7, improved from 7 2  Encouraged low carb diet and exercise  Orders:  -     Comprehensive metabolic panel; Future  -     Hemoglobin A1C; Future  -     Lipid Panel with Direct LDL reflex; Future  -     TSH, 3rd generation with Free T4 reflex; Future  -     Microalbumin / creatinine urine ratio    Mixed hyperlipidemia  Comments:  Not well controlled  It was discussed with patient about low fat diet and regular exercise  Continue same  Will continue to monitor  Mild intermittent asthma without complication  Comments:  Stable, continue same and monitor  Elevated blood pressure reading in office without diagnosis of hypertension  Comments:  Stable without medications  Continue try to lose weight and follow low-sodium diet  Will continue to monitor  Subjective:      Patient ID: Immanuel Grace is a 40 y o  male  R S  Is a 40year old male with a PMH of DM2 and HLD  Patient stated he feels overall well and stated 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  Patient stated he is eating more proteins, less carbs, and less sugary foods and drinks  A1c is 7 slightly improved from previous at 7 2  Patient has been taking his metformin 500 mg twice a day, denied any side effects  Patient had his flu shot over the weekend  Patient does follow with an eye doctor  The following portions of the patient's history were reviewed and updated as appropriate: allergies, current medications, past family history, past medical history, past social history, past surgical history and problem list     Review of Systems   Constitutional: Negative for chills and fever  HENT: Negative for congestion, rhinorrhea and sore throat  Eyes: Negative for visual disturbance     Respiratory: Negative for cough and shortness of breath  Cardiovascular: Negative for chest pain and palpitations  Gastrointestinal: Negative for constipation, diarrhea, nausea and vomiting  Genitourinary: Negative for dysuria and hematuria  Musculoskeletal: Negative for myalgias  Skin: Negative for rash  Neurological: Negative for dizziness, syncope, light-headedness and headaches  Objective:      /80 (BP Location: Left arm, Patient Position: Sitting, Cuff Size: Large)   Pulse 86   Temp 97 9 °F (36 6 °C) (Tympanic)   Resp 16   Ht 5' 11" (1 803 m)   Wt 105 kg (231 lb 9 6 oz)   SpO2 97%   BMI 32 30 kg/m²          Physical Exam   Constitutional: He is oriented to person, place, and time  He appears well-developed and well-nourished  No distress  HENT:   Head: Normocephalic and atraumatic  Right Ear: External ear normal    Left Ear: External ear normal    Nose: Nose normal    Mouth/Throat: Oropharynx is clear and moist  No oropharyngeal exudate  Eyes: Pupils are equal, round, and reactive to light  Conjunctivae and EOM are normal    Neck: Normal range of motion  Neck supple  Cardiovascular: Normal rate, regular rhythm and normal heart sounds  Pulmonary/Chest: Effort normal and breath sounds normal    Abdominal: Soft  Bowel sounds are normal    Musculoskeletal: Normal range of motion  He exhibits no edema  Neurological: He is alert and oriented to person, place, and time  Skin: Skin is warm and dry  Psychiatric: He has a normal mood and affect  Nursing note and vitals reviewed

## 2019-12-03 DIAGNOSIS — E78.2 MIXED HYPERLIPIDEMIA: ICD-10-CM

## 2019-12-03 RX ORDER — ROSUVASTATIN CALCIUM 10 MG/1
TABLET, COATED ORAL
Qty: 90 TABLET | Refills: 1 | Status: SHIPPED | OUTPATIENT
Start: 2019-12-03 | End: 2020-11-30 | Stop reason: SDUPTHER

## 2019-12-11 DIAGNOSIS — E11.9 TYPE 2 DIABETES MELLITUS WITHOUT COMPLICATION, WITHOUT LONG-TERM CURRENT USE OF INSULIN (HCC): ICD-10-CM

## 2019-12-11 RX ORDER — METFORMIN HYDROCHLORIDE 500 MG/1
TABLET, EXTENDED RELEASE ORAL
Qty: 90 TABLET | Refills: 3 | Status: SHIPPED | OUTPATIENT
Start: 2019-12-11 | End: 2020-01-17

## 2020-01-17 DIAGNOSIS — E11.9 TYPE 2 DIABETES MELLITUS WITHOUT COMPLICATION, WITHOUT LONG-TERM CURRENT USE OF INSULIN (HCC): ICD-10-CM

## 2020-01-17 RX ORDER — METFORMIN HYDROCHLORIDE 500 MG/1
TABLET, EXTENDED RELEASE ORAL
Qty: 180 TABLET | Refills: 0 | Status: SHIPPED | OUTPATIENT
Start: 2020-01-17 | End: 2020-05-06

## 2020-02-12 LAB
LEFT EYE DIABETIC RETINOPATHY: NORMAL
RIGHT EYE DIABETIC RETINOPATHY: NORMAL

## 2020-05-05 DIAGNOSIS — E11.9 TYPE 2 DIABETES MELLITUS WITHOUT COMPLICATION, WITHOUT LONG-TERM CURRENT USE OF INSULIN (HCC): ICD-10-CM

## 2020-05-06 RX ORDER — METFORMIN HYDROCHLORIDE 500 MG/1
TABLET, EXTENDED RELEASE ORAL
Qty: 60 TABLET | Refills: 2 | Status: SHIPPED | OUTPATIENT
Start: 2020-05-06 | End: 2020-10-01

## 2020-05-20 ENCOUNTER — APPOINTMENT (OUTPATIENT)
Dept: LAB | Facility: IMAGING CENTER | Age: 45
End: 2020-05-20
Payer: COMMERCIAL

## 2020-05-20 DIAGNOSIS — E11.9 TYPE 2 DIABETES MELLITUS WITHOUT COMPLICATION, WITHOUT LONG-TERM CURRENT USE OF INSULIN (HCC): Chronic | ICD-10-CM

## 2020-05-20 LAB
ALBUMIN SERPL BCP-MCNC: 4.1 G/DL (ref 3.5–5)
ALP SERPL-CCNC: 66 U/L (ref 46–116)
ALT SERPL W P-5'-P-CCNC: 77 U/L (ref 12–78)
ANION GAP SERPL CALCULATED.3IONS-SCNC: 6 MMOL/L (ref 4–13)
AST SERPL W P-5'-P-CCNC: 32 U/L (ref 5–45)
BILIRUB SERPL-MCNC: 0.57 MG/DL (ref 0.2–1)
BUN SERPL-MCNC: 13 MG/DL (ref 5–25)
CALCIUM SERPL-MCNC: 8.7 MG/DL (ref 8.3–10.1)
CHLORIDE SERPL-SCNC: 106 MMOL/L (ref 100–108)
CHOLEST SERPL-MCNC: 134 MG/DL (ref 50–200)
CO2 SERPL-SCNC: 25 MMOL/L (ref 21–32)
CREAT SERPL-MCNC: 0.98 MG/DL (ref 0.6–1.3)
CREAT UR-MCNC: 66.9 MG/DL
EST. AVERAGE GLUCOSE BLD GHB EST-MCNC: 148 MG/DL
GFR SERPL CREATININE-BSD FRML MDRD: 93 ML/MIN/1.73SQ M
GLUCOSE P FAST SERPL-MCNC: 123 MG/DL (ref 65–99)
HBA1C MFR BLD: 6.8 %
HDLC SERPL-MCNC: 39 MG/DL
LDLC SERPL CALC-MCNC: 64 MG/DL (ref 0–100)
MICROALBUMIN UR-MCNC: 6.1 MG/L (ref 0–20)
MICROALBUMIN/CREAT 24H UR: 9 MG/G CREATININE (ref 0–30)
POTASSIUM SERPL-SCNC: 3.9 MMOL/L (ref 3.5–5.3)
PROT SERPL-MCNC: 7.4 G/DL (ref 6.4–8.2)
SODIUM SERPL-SCNC: 137 MMOL/L (ref 136–145)
TRIGL SERPL-MCNC: 156 MG/DL
TSH SERPL DL<=0.05 MIU/L-ACNC: 1.52 UIU/ML (ref 0.36–3.74)

## 2020-05-20 PROCEDURE — 36415 COLL VENOUS BLD VENIPUNCTURE: CPT

## 2020-05-20 PROCEDURE — 82570 ASSAY OF URINE CREATININE: CPT | Performed by: FAMILY MEDICINE

## 2020-05-20 PROCEDURE — 3044F HG A1C LEVEL LT 7.0%: CPT | Performed by: FAMILY MEDICINE

## 2020-05-20 PROCEDURE — 83036 HEMOGLOBIN GLYCOSYLATED A1C: CPT

## 2020-05-20 PROCEDURE — 82043 UR ALBUMIN QUANTITATIVE: CPT | Performed by: FAMILY MEDICINE

## 2020-05-20 PROCEDURE — 80053 COMPREHEN METABOLIC PANEL: CPT

## 2020-05-20 PROCEDURE — 84443 ASSAY THYROID STIM HORMONE: CPT

## 2020-05-20 PROCEDURE — 80061 LIPID PANEL: CPT

## 2020-05-20 PROCEDURE — 3061F NEG MICROALBUMINURIA REV: CPT | Performed by: FAMILY MEDICINE

## 2020-05-26 ENCOUNTER — TELEPHONE (OUTPATIENT)
Dept: FAMILY MEDICINE CLINIC | Facility: CLINIC | Age: 45
End: 2020-05-26

## 2020-05-28 ENCOUNTER — OFFICE VISIT (OUTPATIENT)
Dept: FAMILY MEDICINE CLINIC | Facility: CLINIC | Age: 45
End: 2020-05-28
Payer: COMMERCIAL

## 2020-05-28 VITALS
DIASTOLIC BLOOD PRESSURE: 72 MMHG | HEIGHT: 71 IN | OXYGEN SATURATION: 98 % | RESPIRATION RATE: 16 BRPM | HEART RATE: 74 BPM | TEMPERATURE: 97.3 F | SYSTOLIC BLOOD PRESSURE: 134 MMHG | BODY MASS INDEX: 32.39 KG/M2 | WEIGHT: 231.38 LBS

## 2020-05-28 DIAGNOSIS — E78.2 MIXED HYPERLIPIDEMIA: ICD-10-CM

## 2020-05-28 DIAGNOSIS — J45.20 MILD INTERMITTENT ASTHMA WITHOUT COMPLICATION: ICD-10-CM

## 2020-05-28 DIAGNOSIS — L24.7 IRRITANT CONTACT DERMATITIS DUE TO PLANTS, EXCEPT FOOD: ICD-10-CM

## 2020-05-28 DIAGNOSIS — E11.9 TYPE 2 DIABETES MELLITUS WITHOUT COMPLICATION, WITHOUT LONG-TERM CURRENT USE OF INSULIN (HCC): Primary | ICD-10-CM

## 2020-05-28 PROBLEM — L25.9 CONTACT DERMATITIS: Status: ACTIVE | Noted: 2020-05-28

## 2020-05-28 PROCEDURE — 99214 OFFICE O/P EST MOD 30 MIN: CPT | Performed by: FAMILY MEDICINE

## 2020-05-28 PROCEDURE — 3008F BODY MASS INDEX DOCD: CPT | Performed by: FAMILY MEDICINE

## 2020-05-28 PROCEDURE — 3044F HG A1C LEVEL LT 7.0%: CPT | Performed by: FAMILY MEDICINE

## 2020-05-28 PROCEDURE — 3075F SYST BP GE 130 - 139MM HG: CPT | Performed by: FAMILY MEDICINE

## 2020-05-28 PROCEDURE — 3078F DIAST BP <80 MM HG: CPT | Performed by: FAMILY MEDICINE

## 2020-05-28 PROCEDURE — 1036F TOBACCO NON-USER: CPT | Performed by: FAMILY MEDICINE

## 2020-05-28 PROCEDURE — 2022F DILAT RTA XM EVC RTNOPTHY: CPT | Performed by: FAMILY MEDICINE

## 2020-05-28 RX ORDER — TRIAMCINOLONE ACETONIDE 5 MG/G
CREAM TOPICAL 3 TIMES DAILY
Qty: 30 G | Refills: 1 | Status: SHIPPED | OUTPATIENT
Start: 2020-05-28

## 2020-10-01 DIAGNOSIS — E11.9 TYPE 2 DIABETES MELLITUS WITHOUT COMPLICATION, WITHOUT LONG-TERM CURRENT USE OF INSULIN (HCC): ICD-10-CM

## 2020-10-01 RX ORDER — METFORMIN HYDROCHLORIDE 500 MG/1
TABLET, EXTENDED RELEASE ORAL
Qty: 60 TABLET | Refills: 2 | Status: SHIPPED | OUTPATIENT
Start: 2020-10-01 | End: 2021-02-14

## 2020-10-30 ENCOUNTER — TRANSCRIBE ORDERS (OUTPATIENT)
Dept: ADMINISTRATIVE | Facility: HOSPITAL | Age: 45
End: 2020-10-30

## 2020-10-30 DIAGNOSIS — H91.8X2 OTHER SPECIFIED HEARING LOSS, LEFT EAR: Primary | ICD-10-CM

## 2020-11-20 ENCOUNTER — HOSPITAL ENCOUNTER (OUTPATIENT)
Dept: RADIOLOGY | Age: 45
Discharge: HOME | End: 2020-11-20
Payer: COMMERCIAL

## 2020-11-20 DIAGNOSIS — H91.8X2 OTHER SPECIFIED HEARING LOSS, LEFT EAR: ICD-10-CM

## 2020-11-20 PROCEDURE — G1004 CDSM NDSC: HCPCS

## 2020-11-20 PROCEDURE — 70553 MRI BRAIN STEM W/O & W/DYE: CPT

## 2020-11-20 PROCEDURE — A9585 GADOBUTROL INJECTION: HCPCS | Performed by: RADIOLOGY

## 2020-11-20 RX ADMIN — GADOBUTROL 10 ML: 604.72 INJECTION INTRAVENOUS at 11:09

## 2020-11-24 ENCOUNTER — LAB (OUTPATIENT)
Dept: LAB | Facility: IMAGING CENTER | Age: 45
End: 2020-11-24
Payer: COMMERCIAL

## 2020-11-24 DIAGNOSIS — E11.9 TYPE 2 DIABETES MELLITUS WITHOUT COMPLICATION, WITHOUT LONG-TERM CURRENT USE OF INSULIN (HCC): ICD-10-CM

## 2020-11-24 LAB
ALBUMIN SERPL BCP-MCNC: 4.1 G/DL (ref 3.5–5)
ALP SERPL-CCNC: 60 U/L (ref 46–116)
ALT SERPL W P-5'-P-CCNC: 88 U/L (ref 12–78)
ANION GAP SERPL CALCULATED.3IONS-SCNC: 5 MMOL/L (ref 4–13)
AST SERPL W P-5'-P-CCNC: 35 U/L (ref 5–45)
BASOPHILS # BLD AUTO: 0.04 THOUSANDS/ΜL (ref 0–0.1)
BASOPHILS NFR BLD AUTO: 1 % (ref 0–1)
BILIRUB SERPL-MCNC: 0.55 MG/DL (ref 0.2–1)
BUN SERPL-MCNC: 11 MG/DL (ref 5–25)
CALCIUM SERPL-MCNC: 9.6 MG/DL (ref 8.3–10.1)
CHLORIDE SERPL-SCNC: 104 MMOL/L (ref 100–108)
CHOLEST SERPL-MCNC: 188 MG/DL (ref 50–200)
CO2 SERPL-SCNC: 28 MMOL/L (ref 21–32)
CREAT SERPL-MCNC: 1 MG/DL (ref 0.6–1.3)
CREAT UR-MCNC: 69.8 MG/DL
EOSINOPHIL # BLD AUTO: 0.14 THOUSAND/ΜL (ref 0–0.61)
EOSINOPHIL NFR BLD AUTO: 3 % (ref 0–6)
ERYTHROCYTE [DISTWIDTH] IN BLOOD BY AUTOMATED COUNT: 13.4 % (ref 11.6–15.1)
EST. AVERAGE GLUCOSE BLD GHB EST-MCNC: 143 MG/DL
GFR SERPL CREATININE-BSD FRML MDRD: 90 ML/MIN/1.73SQ M
GLUCOSE P FAST SERPL-MCNC: 129 MG/DL (ref 65–99)
HBA1C MFR BLD: 6.6 %
HCT VFR BLD AUTO: 47.7 % (ref 36.5–49.3)
HDLC SERPL-MCNC: 39 MG/DL
HGB BLD-MCNC: 15.1 G/DL (ref 12–17)
IMM GRANULOCYTES # BLD AUTO: 0.02 THOUSAND/UL (ref 0–0.2)
IMM GRANULOCYTES NFR BLD AUTO: 0 % (ref 0–2)
LDLC SERPL CALC-MCNC: 112 MG/DL (ref 0–100)
LYMPHOCYTES # BLD AUTO: 1.88 THOUSANDS/ΜL (ref 0.6–4.47)
LYMPHOCYTES NFR BLD AUTO: 34 % (ref 14–44)
MCH RBC QN AUTO: 29.2 PG (ref 26.8–34.3)
MCHC RBC AUTO-ENTMCNC: 31.7 G/DL (ref 31.4–37.4)
MCV RBC AUTO: 92 FL (ref 82–98)
MICROALBUMIN UR-MCNC: 9.6 MG/L (ref 0–20)
MICROALBUMIN/CREAT 24H UR: 14 MG/G CREATININE (ref 0–30)
MONOCYTES # BLD AUTO: 0.58 THOUSAND/ΜL (ref 0.17–1.22)
MONOCYTES NFR BLD AUTO: 11 % (ref 4–12)
NEUTROPHILS # BLD AUTO: 2.84 THOUSANDS/ΜL (ref 1.85–7.62)
NEUTS SEG NFR BLD AUTO: 51 % (ref 43–75)
NRBC BLD AUTO-RTO: 0 /100 WBCS
PLATELET # BLD AUTO: 199 THOUSANDS/UL (ref 149–390)
PMV BLD AUTO: 11 FL (ref 8.9–12.7)
POTASSIUM SERPL-SCNC: 4.1 MMOL/L (ref 3.5–5.3)
PROT SERPL-MCNC: 7.2 G/DL (ref 6.4–8.2)
RBC # BLD AUTO: 5.18 MILLION/UL (ref 3.88–5.62)
SODIUM SERPL-SCNC: 137 MMOL/L (ref 136–145)
TRIGL SERPL-MCNC: 184 MG/DL
TSH SERPL DL<=0.05 MIU/L-ACNC: 1.07 UIU/ML (ref 0.36–3.74)
WBC # BLD AUTO: 5.5 THOUSAND/UL (ref 4.31–10.16)

## 2020-11-24 PROCEDURE — 80053 COMPREHEN METABOLIC PANEL: CPT

## 2020-11-24 PROCEDURE — 84443 ASSAY THYROID STIM HORMONE: CPT

## 2020-11-24 PROCEDURE — 82043 UR ALBUMIN QUANTITATIVE: CPT | Performed by: FAMILY MEDICINE

## 2020-11-24 PROCEDURE — 85025 COMPLETE CBC W/AUTO DIFF WBC: CPT

## 2020-11-24 PROCEDURE — 36415 COLL VENOUS BLD VENIPUNCTURE: CPT

## 2020-11-24 PROCEDURE — 83036 HEMOGLOBIN GLYCOSYLATED A1C: CPT

## 2020-11-24 PROCEDURE — 80061 LIPID PANEL: CPT

## 2020-11-24 PROCEDURE — 82570 ASSAY OF URINE CREATININE: CPT | Performed by: FAMILY MEDICINE

## 2020-11-30 ENCOUNTER — OFFICE VISIT (OUTPATIENT)
Dept: FAMILY MEDICINE CLINIC | Facility: CLINIC | Age: 45
End: 2020-11-30
Payer: COMMERCIAL

## 2020-11-30 VITALS
OXYGEN SATURATION: 98 % | TEMPERATURE: 97.8 F | HEART RATE: 78 BPM | HEIGHT: 71 IN | DIASTOLIC BLOOD PRESSURE: 88 MMHG | WEIGHT: 229.13 LBS | SYSTOLIC BLOOD PRESSURE: 138 MMHG | RESPIRATION RATE: 16 BRPM | BODY MASS INDEX: 32.08 KG/M2

## 2020-11-30 DIAGNOSIS — I10 ESSENTIAL HYPERTENSION: ICD-10-CM

## 2020-11-30 DIAGNOSIS — G89.29 CHRONIC RIGHT SHOULDER PAIN: ICD-10-CM

## 2020-11-30 DIAGNOSIS — Z00.00 HEALTHCARE MAINTENANCE: Primary | ICD-10-CM

## 2020-11-30 DIAGNOSIS — E11.9 TYPE 2 DIABETES MELLITUS WITHOUT COMPLICATION, WITHOUT LONG-TERM CURRENT USE OF INSULIN (HCC): ICD-10-CM

## 2020-11-30 DIAGNOSIS — E78.2 MIXED HYPERLIPIDEMIA: ICD-10-CM

## 2020-11-30 DIAGNOSIS — M25.511 CHRONIC RIGHT SHOULDER PAIN: ICD-10-CM

## 2020-11-30 PROCEDURE — 99396 PREV VISIT EST AGE 40-64: CPT | Performed by: FAMILY MEDICINE

## 2020-11-30 RX ORDER — PREDNISONE 50 MG/1
50 TABLET ORAL DAILY
Qty: 5 TABLET | Refills: 0 | Status: SHIPPED | OUTPATIENT
Start: 2020-11-30 | End: 2020-12-05

## 2020-11-30 RX ORDER — LISINOPRIL 10 MG/1
10 TABLET ORAL DAILY
Qty: 90 TABLET | Refills: 3 | Status: SHIPPED | OUTPATIENT
Start: 2020-11-30 | End: 2020-12-30

## 2020-11-30 RX ORDER — AZELASTINE 1 MG/ML
SPRAY, METERED NASAL
COMMUNITY
Start: 2020-11-22

## 2020-11-30 RX ORDER — ROSUVASTATIN CALCIUM 10 MG/1
10 TABLET, COATED ORAL DAILY
Qty: 90 TABLET | Refills: 1 | Status: SHIPPED | OUTPATIENT
Start: 2020-11-30 | End: 2021-05-21

## 2020-12-08 ENCOUNTER — OFFICE VISIT (OUTPATIENT)
Dept: FAMILY MEDICINE CLINIC | Facility: CLINIC | Age: 45
End: 2020-12-08
Payer: COMMERCIAL

## 2020-12-08 VITALS
WEIGHT: 231.13 LBS | OXYGEN SATURATION: 96 % | TEMPERATURE: 97.4 F | SYSTOLIC BLOOD PRESSURE: 122 MMHG | DIASTOLIC BLOOD PRESSURE: 80 MMHG | RESPIRATION RATE: 16 BRPM | HEART RATE: 76 BPM | BODY MASS INDEX: 32.36 KG/M2 | HEIGHT: 71 IN

## 2020-12-08 DIAGNOSIS — I10 ESSENTIAL HYPERTENSION: ICD-10-CM

## 2020-12-08 DIAGNOSIS — M25.511 CHRONIC RIGHT SHOULDER PAIN: Primary | ICD-10-CM

## 2020-12-08 DIAGNOSIS — G89.29 CHRONIC RIGHT SHOULDER PAIN: Primary | ICD-10-CM

## 2020-12-08 PROCEDURE — 1036F TOBACCO NON-USER: CPT | Performed by: FAMILY MEDICINE

## 2020-12-08 PROCEDURE — 3079F DIAST BP 80-89 MM HG: CPT | Performed by: FAMILY MEDICINE

## 2020-12-08 PROCEDURE — 3008F BODY MASS INDEX DOCD: CPT | Performed by: FAMILY MEDICINE

## 2020-12-08 PROCEDURE — 3074F SYST BP LT 130 MM HG: CPT | Performed by: FAMILY MEDICINE

## 2020-12-08 PROCEDURE — 99214 OFFICE O/P EST MOD 30 MIN: CPT | Performed by: FAMILY MEDICINE

## 2020-12-29 ENCOUNTER — TELEPHONE (OUTPATIENT)
Dept: FAMILY MEDICINE CLINIC | Facility: CLINIC | Age: 45
End: 2020-12-29

## 2020-12-30 ENCOUNTER — APPOINTMENT (OUTPATIENT)
Dept: RADIOLOGY | Facility: OTHER | Age: 45
End: 2020-12-30
Payer: COMMERCIAL

## 2020-12-30 ENCOUNTER — CONSULT (OUTPATIENT)
Dept: OBGYN CLINIC | Facility: OTHER | Age: 45
End: 2020-12-30
Payer: COMMERCIAL

## 2020-12-30 VITALS
HEART RATE: 86 BPM | SYSTOLIC BLOOD PRESSURE: 116 MMHG | HEIGHT: 71 IN | WEIGHT: 231 LBS | DIASTOLIC BLOOD PRESSURE: 78 MMHG | BODY MASS INDEX: 32.34 KG/M2

## 2020-12-30 DIAGNOSIS — M75.81 TENDINITIS OF RIGHT ROTATOR CUFF: ICD-10-CM

## 2020-12-30 DIAGNOSIS — M75.21 BICEPS TENDINITIS OF RIGHT UPPER EXTREMITY: ICD-10-CM

## 2020-12-30 DIAGNOSIS — M25.511 RIGHT SHOULDER PAIN, UNSPECIFIED CHRONICITY: Primary | ICD-10-CM

## 2020-12-30 DIAGNOSIS — M25.511 CHRONIC RIGHT SHOULDER PAIN: ICD-10-CM

## 2020-12-30 DIAGNOSIS — G89.29 CHRONIC RIGHT SHOULDER PAIN: ICD-10-CM

## 2020-12-30 DIAGNOSIS — M25.511 RIGHT SHOULDER PAIN, UNSPECIFIED CHRONICITY: ICD-10-CM

## 2020-12-30 DIAGNOSIS — I10 ESSENTIAL HYPERTENSION: Primary | ICD-10-CM

## 2020-12-30 PROCEDURE — 99203 OFFICE O/P NEW LOW 30 MIN: CPT | Performed by: ORTHOPAEDIC SURGERY

## 2020-12-30 PROCEDURE — 3078F DIAST BP <80 MM HG: CPT | Performed by: ORTHOPAEDIC SURGERY

## 2020-12-30 PROCEDURE — 4010F ACE/ARB THERAPY RXD/TAKEN: CPT | Performed by: ORTHOPAEDIC SURGERY

## 2020-12-30 PROCEDURE — 1036F TOBACCO NON-USER: CPT | Performed by: ORTHOPAEDIC SURGERY

## 2020-12-30 PROCEDURE — 73030 X-RAY EXAM OF SHOULDER: CPT

## 2020-12-30 PROCEDURE — 3008F BODY MASS INDEX DOCD: CPT | Performed by: ORTHOPAEDIC SURGERY

## 2020-12-30 PROCEDURE — 3074F SYST BP LT 130 MM HG: CPT | Performed by: ORTHOPAEDIC SURGERY

## 2020-12-30 RX ORDER — LOSARTAN POTASSIUM 25 MG/1
25 TABLET ORAL DAILY
Qty: 30 TABLET | Refills: 3 | Status: SHIPPED | OUTPATIENT
Start: 2020-12-30 | End: 2021-04-27

## 2021-01-05 ENCOUNTER — EVALUATION (OUTPATIENT)
Dept: PHYSICAL THERAPY | Age: 46
End: 2021-01-05
Payer: COMMERCIAL

## 2021-01-05 DIAGNOSIS — M75.81 TENDINITIS OF RIGHT ROTATOR CUFF: Primary | ICD-10-CM

## 2021-01-05 DIAGNOSIS — M75.21 BICEPS TENDINITIS OF RIGHT UPPER EXTREMITY: ICD-10-CM

## 2021-01-05 PROCEDURE — 97161 PT EVAL LOW COMPLEX 20 MIN: CPT | Performed by: PHYSICAL THERAPIST

## 2021-01-05 PROCEDURE — 97110 THERAPEUTIC EXERCISES: CPT | Performed by: PHYSICAL THERAPIST

## 2021-01-05 NOTE — PROGRESS NOTES
PT Evaluation     Today's date: 2021  Patient name: Carl Ambrose  : 1975  MRN: [id number]  Referring provider: Rebeca Crespo DPM  Dx:   Encounter Diagnosis     ICD-10-CM    1  Tendinitis of right rotator cuff  M75 81 Ambulatory referral to Physical Therapy   2  Biceps tendinitis of right upper extremity  M75 21 Ambulatory referral to Physical Therapy                  Assessment  Assessment details: Patient seen for PT evaluation for R shoulder pain  Patient presents with decreased ROM and strength, poor posture, no formal HEP to address deficits, pain at rest and with activity  Patient presents with impingement, rotator cuff tendonitis type pain  Established HEP for gentle stretches and strengthening TE, focusing on regaining scapular strength  Patient is a good candidate for PT  Thank you for your referrals  Impairments: abnormal or restricted ROM, activity intolerance, impaired physical strength, lacks appropriate home exercise program, pain with function, scapular dyskinesis, poor posture  and poor body mechanics  Functional limitations: Patient reports mild to moderate limitations with work duties, with IADLs, with recreational activitiies, difficulty with dressing, washing his hair and reaching overhead (ie to put dishes away)  Understanding of Dx/Px/POC: good   Prognosis: good    Goals  Impairment Goals to be met within 4 weeks  - Decrease pain to 0/10  - Improve ROM to WNL grossly throughout R shoulder  - Increase strength to 5/5 throughout  - Improve upright posture     Functional Goals to be met within 4-6 weeks  - Return to Prior Level of Function  - Increase Functional Status Measure to: expected  - Patient will be independent with HEP  - Patient to be able to tolerate upright posture throughout work day  - Patient to be able to tolerate reaching overhead in the cabinet without shoulder pain         Plan  Patient would benefit from: skilled physical therapy  Planned modality interventions: cryotherapy and thermotherapy: hydrocollator packs  Planned therapy interventions: activity modification, joint mobilization, manual therapy, neuromuscular re-education, patient education, postural training, strengthening, stretching, therapeutic activities, therapeutic exercise, home exercise program, gait training and body mechanics training  Frequency: 1x week  Duration in weeks: 6  Treatment plan discussed with: patient        Subjective Evaluation    History of Present Illness  Mechanism of injury: Patient reports slow insidious onset of R shoulder pain x 6-8 months ago  Patient denies trauma, does note that he was taking OTC medications for his pain  Patient decided to follow up with the doctor when he tried to use the monkey bars with his son at a playground and felt immediate, intense R shoulder pain  Patient works for graphic design on the computer  Patient has an ergonomic set up for his work and computer set up; works from home and has been since he's been working 20+ years  Patient finds himself leaning on his elbow/forearm which increases his shoulder pain  Patient works full time, 40-50 hrs/week  Patient reports that his PCP gave him prednisone initially; patient reported no relief     Pain  Current pain ratin  At best pain ratin  At worst pain ratin  Location: R shoulder  Quality: sharp, tight and discomfort  Relieving factors: medications  Aggravating factors: lifting, overhead activity and keyboarding  Progression: no change    Hand dominance: ambidextrous      Diagnostic Tests  X-ray: normal  Treatments  No previous or current treatments  Patient Goals  Patient goals for therapy: decreased pain, increased motion, independence with ADLs/IADLs and return to sport/leisure activities          Objective     Active Range of Motion   Left Shoulder   Flexion: 165 degrees   Extension: 70 degrees   Abduction: 175 degrees   External rotation BTH: WFL  Internal rotation BTB: WFL    Right Shoulder   Flexion: 135 degrees with pain  Extension: 65 degrees with pain  Abduction: 130 degrees with pain  External rotation 45°: 60 degrees with pain  Internal rotation BTB: T10 with pain    Left Elbow   Normal active range of motion    Right Elbow   Normal active range of motion    Strength/Myotome Testing     Left Shoulder   Normal muscle strength    Right Shoulder     Planes of Motion   Flexion: 4-   Abduction: 4-   External rotation at 0°: 4   Internal rotation at 0°: 4     Left Elbow   Normal strength    Right Elbow   Normal strength      Flowsheet Rows      Most Recent Value   PT/OT G-Codes   Current Score  60   Projected Score  73             Precautions: none    TE for R unless noted  Manuals 1/5                                       Neuro Re-Ed                                                                Ther Ex                pulleys                Wall slides flex        Corner pec stretch                Prone Is        Prone single arm flex, abd, ext        Prone single rows                S/l shdl abd        S/l ER        Serratus ant        triceps                Standing flexion, scaption                                                                        Ther Activity                        Gait Training                        Modalities                        Progress as able

## 2021-01-12 ENCOUNTER — OFFICE VISIT (OUTPATIENT)
Dept: PHYSICAL THERAPY | Age: 46
End: 2021-01-12
Payer: COMMERCIAL

## 2021-01-12 DIAGNOSIS — M75.81 TENDINITIS OF RIGHT ROTATOR CUFF: Primary | ICD-10-CM

## 2021-01-12 DIAGNOSIS — M75.21 BICEPS TENDINITIS OF RIGHT UPPER EXTREMITY: ICD-10-CM

## 2021-01-12 PROCEDURE — 97110 THERAPEUTIC EXERCISES: CPT

## 2021-01-12 NOTE — PROGRESS NOTES
Daily Note     Today's date: 2021  Patient name: Kellie Obregon  : 1975  MRN: [id number]  Referring provider: Sherry Sung MD  Dx:   Encounter Diagnosis     ICD-10-CM    1  Tendinitis of right rotator cuff  M75 81    2  Biceps tendinitis of right upper extremity  M75 21                   Subjective: Main c/o is stiffness and soreness of end range flexion, ER  " It doesn't hurt that much that it hinders me but I did not want to the pain to get out of control so I came to therapy"  Reports no pain pain at current and no issues after IE      Objective: See treatment diary below      Assessment: Tolerated treatment well  Main focus was initiating home ex  Handouts were issued for home ex  Pt with only some minor stiffness at end range during ex  Denies pain with any active ex performed      Plan: Cont PT per LPT plan   Will adjust ex PRN     Precautions: none    TE for R unless noted  Manuals                                       Neuro Re-Ed                                                                Ther Ex                pulleys  5min              Wall slides flex  2x10      Corner pec stretch  [id number]              Prone Is        Prone single arm flex, abd, ext  2# 2x10-ext, 1# 2x10 abd      Prone single rows        Tband rows &ext  Red 2x10      S/l shld abd  2# 2x10      S/l ER  2# 2x10      Serratus ant  3# 2x10      triceps  3# 2x10              Standing flexion, scaption  2# 2x10                                                                      Ther Activity                        Gait Training                        Modalities                        Progress as able

## 2021-01-19 ENCOUNTER — OFFICE VISIT (OUTPATIENT)
Dept: PHYSICAL THERAPY | Age: 46
End: 2021-01-19
Payer: COMMERCIAL

## 2021-01-19 DIAGNOSIS — M75.21 BICEPS TENDINITIS OF RIGHT UPPER EXTREMITY: ICD-10-CM

## 2021-01-19 DIAGNOSIS — M75.81 TENDINITIS OF RIGHT ROTATOR CUFF: Primary | ICD-10-CM

## 2021-01-19 PROCEDURE — 97110 THERAPEUTIC EXERCISES: CPT

## 2021-01-19 NOTE — PROGRESS NOTES
Daily Note     Today's date: 2021  Patient name: Lorenzo Andrews  : 1975  MRN: [id number]  Referring provider: Gilson Fofana MD  Dx:   Encounter Diagnosis     ICD-10-CM    1  Tendinitis of right rotator cuff  M75 81    2  Biceps tendinitis of right upper extremity  M75 21                   Subjective: Main c/o is stiffness and soreness of end range flexion, ER  No R shld pain at current  Pt felt ex  Last visit went well  Objective: See treatment diary below      Assessment: Tolerated treatment well  Main focus was strengthening of R shld along with ROM during ex  Denies pain with any active ex performed  Pt has 2 more visits and should be ready for D/C if no setbacks      Plan: Cont PT per LPT plan   Will adjust ex PRN     Precautions: none    TE for R unless noted  Manuals                                      Neuro Re-Ed                                                                Ther Ex                pulleys  5min 5 min     UBE   10m     Wall slides flex  2x10 2x10     Corner pec stretch  [id number]             Prone Is        Prone single arm flex, abd, ext  2# 2x10-ext, 1# 2x10 abd 2# all 2x10     Prone single rows        Tband rows &ext  Red 2x10 Red 3x10     S/l shld abd  2# 2x10 2# 3x10     S/l ER  2# 2x10 2# 2x10     Serratus ant  3# 2x10 3# 3x10     triceps  3# 2x10 3# 3x10             Standing flexion, scaption  2# 2x10 2# 3x10                                                                     Ther Activity                        Gait Training                        Modalities                        Progress as able

## 2021-01-26 ENCOUNTER — OFFICE VISIT (OUTPATIENT)
Dept: PHYSICAL THERAPY | Age: 46
End: 2021-01-26
Payer: COMMERCIAL

## 2021-01-26 DIAGNOSIS — M75.21 BICEPS TENDINITIS OF RIGHT UPPER EXTREMITY: ICD-10-CM

## 2021-01-26 DIAGNOSIS — M75.81 TENDINITIS OF RIGHT ROTATOR CUFF: Primary | ICD-10-CM

## 2021-01-26 PROCEDURE — 97110 THERAPEUTIC EXERCISES: CPT

## 2021-01-26 NOTE — PROGRESS NOTES
Daily Note     Today's date: 2021  Patient name: Chun Duarte  : 1975  MRN: [id number]  Referring provider: Bekah Lewis MD  Dx:   Encounter Diagnosis     ICD-10-CM    1  Tendinitis of right rotator cuff  M75 81    2  Biceps tendinitis of right upper extremity  M75 21                   Subjective: Main c/o is stiffness and soreness of end range flexion, ER  No R shld pain at current  Pt feels treatment is going well and feels he can be D/C after next visit      Objective: See treatment diary below      Assessment: Tolerated treatment well  Main focus was strengthening of R shld against gravity along with ROM during session  Denies pain with any active ex performed  Pt has 1 more visit and should be ready for D/C if no setbacks      Plan: Cont PT per LPT plan   Will adjust ex PRN     Precautions: none    TE for R unless noted  Manuals                                     Neuro Re-Ed                                                                Ther Ex                pulleys  5min 5 min 5 min    UBE   10m 10m    Wall slides flex  2x10 2x10 2# x10    Corner pec stretch  [id number]            Prone Is        Prone single arm flex, abd, ext  2# 2x10-ext, 1# 2x10 abd 2# all 2x10 # 2 all 2x10    Prone single rows        Tband rows &ext  Red 2x10 Red 3x10 Green 3x10    Tband ER    Red 2x10    S/l shld abd  2# 2x10 2# 3x10 2# 3x10    S/l ER  2# 2x10 2# 2x10 2# 3x10    Serratus ant  3# 2x10 3# 3x10 3# 3x10    triceps  3# 2x10 3# 3x10 3# 3x10            Standing flexion, scaption  2# 2x10 2# 3x10 2# 3x10                                                                    Ther Activity                        Gait Training                        Modalities                        Progress as able

## 2021-02-02 ENCOUNTER — APPOINTMENT (OUTPATIENT)
Dept: PHYSICAL THERAPY | Age: 46
End: 2021-02-02
Payer: COMMERCIAL

## 2021-02-03 ENCOUNTER — APPOINTMENT (OUTPATIENT)
Dept: PHYSICAL THERAPY | Age: 46
End: 2021-02-03
Payer: COMMERCIAL

## 2021-02-09 ENCOUNTER — OFFICE VISIT (OUTPATIENT)
Dept: PHYSICAL THERAPY | Age: 46
End: 2021-02-09
Payer: COMMERCIAL

## 2021-02-09 DIAGNOSIS — M75.81 TENDINITIS OF RIGHT ROTATOR CUFF: Primary | ICD-10-CM

## 2021-02-09 DIAGNOSIS — M75.21 BICEPS TENDINITIS OF RIGHT UPPER EXTREMITY: ICD-10-CM

## 2021-02-09 PROCEDURE — 97110 THERAPEUTIC EXERCISES: CPT

## 2021-02-09 NOTE — PROGRESS NOTES
Daily Note     Today's date: 2021  Patient name: Sophia Brambila  : 1975  MRN: [id number]  Referring provider: Lopez Keane MD  Dx:   Encounter Diagnosis     ICD-10-CM    1  Tendinitis of right rotator cuff  M75 81    2  Biceps tendinitis of right upper extremity  M75 21                   Subjective: No pain noted with R shoulder  Pt feels ready for D/C      Objective: See treatment diary below      Assessment: Tolerated treatment well  Competent with home ex for D/C  Handouts were issued   Further assessment per LPT D/C summary      Plan: D/C PT to Home Program     Precautions: none    TE for R unless noted  Manuals                                    Neuro Re-Ed                                                                Ther Ex                pulleys  5min 5 min 5 min 5m   UBE   10m 10m 10m   Wall slides flex  2x10 2x10 2# x10 2# x20   Corner pec stretch  [id number] [id number] [id number]            Prone Is        Prone single arm flex, abd, ext  2# 2x10-ext, 1# 2x10 abd 2# all 2x10 # 2 all 2x10 3# 2x10   Prone single rows        Tband rows &ext  Red 2x10 Red 3x10 Green 3x10 Green 3x10   Tband ER    Red 2x10 Green 3x10   S/l shld abd  2# 2x10 2# 3x10 2# 3x10 3# 3x10   S/l ER  2# 2x10 2# 2x10 2# 3x10 3# 2x10   Serratus ant  3# 2x10 3# 3x10 3# 3x10 3# 3x10   triceps  3# 2x10 3# 3x10 3# 3x10 3# 3x10           Standing flexion, scaption  2# 2x10 2# 3x10 2# 3x10 3# 3x10                                                                   Ther Activity                        Gait Training                        Modalities                        Progress as able

## 2021-02-14 DIAGNOSIS — E11.9 TYPE 2 DIABETES MELLITUS WITHOUT COMPLICATION, WITHOUT LONG-TERM CURRENT USE OF INSULIN (HCC): ICD-10-CM

## 2021-02-14 RX ORDER — METFORMIN HYDROCHLORIDE 500 MG/1
TABLET, EXTENDED RELEASE ORAL
Qty: 60 TABLET | Refills: 2 | Status: SHIPPED | OUTPATIENT
Start: 2021-02-14 | End: 2021-05-12

## 2021-03-10 DIAGNOSIS — Z23 ENCOUNTER FOR IMMUNIZATION: ICD-10-CM

## 2021-04-07 ENCOUNTER — OFFICE VISIT (OUTPATIENT)
Dept: URGENT CARE | Age: 46
End: 2021-04-07
Payer: COMMERCIAL

## 2021-04-07 VITALS
OXYGEN SATURATION: 100 % | RESPIRATION RATE: 20 BRPM | SYSTOLIC BLOOD PRESSURE: 137 MMHG | TEMPERATURE: 97.4 F | HEART RATE: 97 BPM | DIASTOLIC BLOOD PRESSURE: 95 MMHG

## 2021-04-07 DIAGNOSIS — B02.9 HERPES ZOSTER WITHOUT COMPLICATION: Primary | ICD-10-CM

## 2021-04-07 PROCEDURE — 99203 OFFICE O/P NEW LOW 30 MIN: CPT | Performed by: PHYSICIAN ASSISTANT

## 2021-04-07 RX ORDER — VALACYCLOVIR HYDROCHLORIDE 1 G/1
1000 TABLET, FILM COATED ORAL 3 TIMES DAILY
Qty: 21 TABLET | Refills: 0 | Status: SHIPPED | OUTPATIENT
Start: 2021-04-07 | End: 2021-04-14

## 2021-04-07 NOTE — PROGRESS NOTES
[facility]        NAME: Jessica Morse is a 39 y o  male  : 1975    MRN: [id number]  DATE: 2021  TIME: 6:12 PM    Assessment and Plan   Herpes zoster without complication [P61 4]  1  Herpes zoster without complication  valACYclovir (VALTREX) 1,000 mg tablet         Patient Instructions       Follow up with PCP in 3-5 days  Proceed to  ER if symptoms worsen  Chief Complaint     Chief Complaint   Patient presents with    Rash     pt states noticed irritation to right upper back a few days ago, today noticed rash to area, states it burns         History of Present Illness         Patient is started on Monday with a pain on his right upper back  He states he noticed today the pain got worse and he has noticed a rash on his right upper back  It is blistered  He did have chickenpox as a kid  Review of Systems   Review of Systems   Constitutional: Negative  HENT: Negative  Respiratory: Negative  Cardiovascular: Negative  Musculoskeletal: Negative  Skin: Positive for rash  Neurological: Negative  Psychiatric/Behavioral: Negative            Current Medications       Current Outpatient Medications:     aspirin (ECOTRIN LOW STRENGTH) 81 mg EC tablet, Take 81 mg by mouth daily, Disp: , Rfl:     losartan (COZAAR) 25 mg tablet, Take 1 tablet (25 mg total) by mouth daily, Disp: 30 tablet, Rfl: 3    metFORMIN (GLUCOPHAGE-XR) 500 mg 24 hr tablet, TAKE 2 TABLETS BY MOUTH EVERY DAY, Disp: 60 tablet, Rfl: 2    rosuvastatin (CRESTOR) 10 MG tablet, Take 1 tablet (10 mg total) by mouth daily, Disp: 90 tablet, Rfl: 1    albuterol (VENTOLIN HFA) 90 mcg/act inhaler, Inhale 2 puffs every 6 (six) hours as needed for wheezing (Patient not taking: Reported on 2021), Disp: 18 g, Rfl: 1    azelastine (ASTELIN) 0 1 % nasal spray, , Disp: , Rfl:     Cholecalciferol (VITAMIN D3) 2000 units capsule, Take 2,000 Units by mouth daily, Disp: , Rfl:     triamcinolone (KENALOG) 0 5 % cream, Apply topically 3 (three) times a day (Patient not taking: Reported on 4/7/2021), Disp: 30 g, Rfl: 1    valACYclovir (VALTREX) 1,000 mg tablet, Take 1 tablet (1,000 mg total) by mouth 3 (three) times a day for 7 days, Disp: 21 tablet, Rfl: 0    Current Allergies     Allergies as of 04/07/2021 - Reviewed 04/07/2021   Allergen Reaction Noted    Amoxicillin  08/14/2015            The following portions of the patient's history were reviewed and updated as appropriate: allergies, current medications, past family history, past medical history, past social history, past surgical history and problem list      Past Medical History:   Diagnosis Date    Asthma     DM (diabetes mellitus) ([address] )     Hyperlipidemia     Obesity        History reviewed  No pertinent surgical history  History reviewed  No pertinent family history  Medications have been verified  Objective   /95   Pulse 97   Temp (!) 97 4 °F (36 3 °C)   Resp 20   SpO2 100%        Physical Exam     Physical Exam  Vitals signs and nursing note reviewed  Constitutional:       General: He is not in acute distress  Appearance: Normal appearance  He is not ill-appearing, toxic-appearing or diaphoretic  HENT:      Head: Normocephalic and atraumatic  Cardiovascular:      Rate and Rhythm: Normal rate  Pulses: Normal pulses  Pulmonary:      Effort: Pulmonary effort is normal    Skin:     General: Skin is warm and dry  Findings: Rash present  Comments:   Erythematous vesicular rash on right upper back following dermatome  No active drainage   Neurological:      General: No focal deficit present  Mental Status: He is alert and oriented to person, place, and time     Psychiatric:         Mood and Affect: Mood normal          Behavior: Behavior normal

## 2021-04-07 NOTE — PATIENT INSTRUCTIONS
Shingles   WHAT YOU NEED TO KNOW:   Shingles is a painful rash  Shingles is caused by the same virus that causes chickenpox (varicella-zoster)  After you get chickenpox, the virus stays in your body for several years without causing any symptoms  Shingles occurs when the virus becomes active again  The active virus travels along a nerve to your skin and causes a rash  DISCHARGE INSTRUCTIONS:   Call your local emergency number (911 in the [facility]) if:   · You have trouble moving your arms, legs, or face  · You become confused, or have difficulty speaking  · You have a seizure  Return to the emergency department if:   · You have weakness in an arm or leg  · You have dizziness, a severe headache, or hearing or vision loss  · You have painful, red, warm skin around the blisters, or the blisters drain pus  · Your neck is stiff or you have trouble moving it  Call your doctor if:   · You feel weak or have a headache  · You have a cough, chills, or a fever  · You have abdominal pain or nausea, or you are vomiting  · Your rash becomes more itchy or painful  · Your rash spreads to other parts of your body  · Your pain worsens and does not go away even after you take medicine  · You have questions or concerns about your condition or care  Medicines: You may need any of the following:  · Antiviral medicine  helps decrease symptoms and healing time  They may also decrease your risk of developing nerve pain  You will need to start taking them within 3 days of the start of symptoms to prevent nerve pain  · Prescription pain medicine  may be given  Ask your healthcare provider how to take this medicine safely  Some prescription pain medicines contain acetaminophen  Do not take other medicines that contain acetaminophen without talking to your healthcare provider  Too much acetaminophen may cause liver damage  Prescription pain medicine may cause constipation   Ask your healthcare provider how to prevent or treat constipation  · Acetaminophen  decreases pain and fever  It is available without a doctor's order  Ask how much to take and how often to take it  Follow directions  Read the labels of all other medicines you are using to see if they also contain acetaminophen, or ask your doctor or pharmacist  Acetaminophen can cause liver damage if not taken correctly  Do not use more than 4 grams (4,000 milligrams) total of acetaminophen in one day  · NSAIDs , such as ibuprofen, help decrease swelling, pain, and fever  This medicine is available with or without a doctor's order  NSAIDs can cause stomach bleeding or kidney problems in certain people  If you take blood thinner medicine, always ask if NSAIDs are safe for you  Always read the medicine label and follow directions  Do not give these medicines to children under 10months of age without direction from your child's healthcare provider  · Topical anesthetics  are used to numb the skin and decrease pain  They can be a cream, gel, spray, or patch  · Anticonvulsants  decrease nerve pain and may help you sleep at night  · Antidepressants  may be used to decrease nerve pain  · Take your medicine as directed  Contact your healthcare provider if you think your medicine is not helping or if you have side effects  Tell him of her if you are allergic to any medicine  Keep a list of the medicines, vitamins, and herbs you take  Include the amounts, and when and why you take them  Bring the list or the pill bottles to follow-up visits  Carry your medicine list with you in case of an emergency  Self-care:  Keep your rash clean and dry  Cover your rash with a bandage or clothing  Do not use bandages that stick to your skin  The sticky part may irritate your skin and make your rash last longer  Prevent the spread of germs:       · Wash your hands often  Wash your hands several times each day   Wash after you use the bathroom, change a child's diaper, and before you prepare or eat food  Use soap and water every time  Rub your soapy hands together, lacing your fingers  Wash the front and back of your hands, and in between your fingers  Use the fingers of one hand to scrub under the fingernails of the other hand  Wash for at least 20 seconds  Rinse with warm, running water for several seconds  Then dry your hands with a clean towel or paper towel  Use hand  that contains alcohol if soap and water are not available  Do not touch your eyes, nose, or mouth without washing your hands first          · Cover a sneeze or cough  Use a tissue that covers your mouth and nose  Throw the tissue away in a trash can right away  Use the bend of your arm if a tissue is not available  Wash your hands well with soap and water or use a hand   · Stay away from others while you are sick  Avoid crowds as much as possible  · Ask about vaccines you may need  Talk to your healthcare provider about your vaccine history  He or she will tell you which vaccines you need, and when to get them  Prevent shingles or another shingles outbreak:  A vaccine may be given to help prevent shingles  You can get the vaccine even if you already had shingles  The vaccine can help prevent a future outbreak  If you do get shingles again, the vaccine can keep it from becoming severe  The vaccine comes in 2 forms  Your healthcare provider will tell you which form is right for you  The decision is based on your age and any medical conditions you have  A 2-dose vaccine is usually given to adults 48 years or older  A 1-dose vaccine may be given to adults 61 years or older    For more information:   · Centers for Disease Control and Prevention  [address] , [address]  Phone: 0- 957 - [phone]  Phone: 0- 547 - [phone]  Web Address: [employer] com     Follow up with your doctor as directed:  Write down your questions so you remember to ask them during your visits  © Copyright [address] [place] [address] Information is for End User's use only and may not be sold, redistributed or otherwise used for commercial purposes  All illustrations and images included in CareNotes® are the copyrighted property of A D A M [employer]  or Wilfredo Bryant  The above information is an  only  It is not intended as medical advice for individual conditions or treatments  Talk to your doctor, nurse or pharmacist before following any medical regimen to see if it is safe and effective for you

## 2021-04-12 ENCOUNTER — TELEPHONE (OUTPATIENT)
Dept: FAMILY MEDICINE CLINIC | Facility: CLINIC | Age: 46
End: 2021-04-12

## 2021-04-12 DIAGNOSIS — Z23 IMMUNIZATION DUE: Primary | ICD-10-CM

## 2021-04-12 NOTE — TELEPHONE ENCOUNTER
----- Message from Adenike Ramirez sent at 4/12/2021  2:31 PM EDT -----  Regarding: RE: RE: Non-Urgent Medical Question  Contact: [phone]  The pharmacy said I could get the shingles vaccine there if I have a prescription  I'm happy to schedule an appointment with Dr Mary Obrien if he's like to see me before writing the prescription  Thanks!     ----- Message -----  From: Jany Graf  Sent: 4/9/21 3:16 PM  To: Russell Regalado  Subject: RE: RE: Non-Urgent Medical Question    Hello, I did talk to Dr Mary Obrien and he would like you to contact the pharmacy to see if they could administer the vaccine  We only have enough for 2nd dose for our previous patients and vaccinate over 50 in the office for prevenative  Thank you      ----- Message -----       From:Ronald Ardelia Dancer       Sent:4/9/2021 10:59 AM EDT         To:Sharee Obrien MD    Subject:RE: Non-Urgent Medical Question    Thank you for asking Dr Mary Obrien and replying  I called my insurance and was told they do not have an age restriction on covering the shingles vaccine  The only cost factor would depend on whether it was coded as preventative care or non-routine  Would it be possible for your office to confirm this? If it is covered and Dr Mary Obrien says it's ok for me to get the shingles vaccine, I'll make an appointment for after my shingles clear up  Many thanks for your help!      ----- Message -----       From:Phyllis Sutton       Sent:4/8/2021  1:22 PM EDT         To:Ronald Ardelia Dancer    Subject:RE: Non-Urgent Medical Question    Hello, I talked to Dr Mary Obrien and most insurances will not pay for the shingles vaccine until after age 48 which is the recommending age  It is a expensive vaccine that requires 2 doses      Thank you      ----- Message -----       From:Ronald Ardelia Dancer       Sent:4/7/2021  9:17 PM EDT         To:Sharee Obrien MD    Subject:Non-Urgent Medical Question    Hello,    I was diagnosed with shingles today  Would my wife be able to get the shingles vaccine? She's 55years old  Would I be able to get the shingles vaccine after it clears up? I'm 45      Thanks for your help

## 2021-04-12 NOTE — TELEPHONE ENCOUNTER
Pt would like a script sent to the pharmacy for his shingles vaccine since he said insurance will cover it   I sent to provider for approval

## 2021-04-13 RX ORDER — ZOSTER VACCINE RECOMBINANT, ADJUVANTED 50 MCG/0.5
0.5 KIT INTRAMUSCULAR ONCE
Qty: 1 EACH | Refills: 1 | Status: SHIPPED | OUTPATIENT
Start: 2021-04-13 | End: 2021-04-13

## 2021-04-26 DIAGNOSIS — I10 ESSENTIAL HYPERTENSION: ICD-10-CM

## 2021-04-26 RX ORDER — LOSARTAN POTASSIUM 25 MG/1
25 TABLET ORAL DAILY
Qty: 30 TABLET | Refills: 0 | Status: CANCELLED | OUTPATIENT
Start: 2021-04-26

## 2021-04-27 RX ORDER — LOSARTAN POTASSIUM 25 MG/1
TABLET ORAL
Qty: 30 TABLET | Refills: 3 | Status: SHIPPED | OUTPATIENT
Start: 2021-04-27 | End: 2021-09-08

## 2021-05-11 DIAGNOSIS — E11.9 TYPE 2 DIABETES MELLITUS WITHOUT COMPLICATION, WITHOUT LONG-TERM CURRENT USE OF INSULIN (HCC): ICD-10-CM

## 2021-05-11 DIAGNOSIS — B02.9 HERPES ZOSTER WITHOUT COMPLICATION: ICD-10-CM

## 2021-05-12 RX ORDER — METFORMIN HYDROCHLORIDE 500 MG/1
TABLET, EXTENDED RELEASE ORAL
Qty: 60 TABLET | Refills: 2 | Status: SHIPPED | OUTPATIENT
Start: 2021-05-12 | End: 2021-08-05

## 2021-05-12 RX ORDER — VALACYCLOVIR HYDROCHLORIDE 1 G/1
TABLET, FILM COATED ORAL
Qty: 21 TABLET | Refills: 0 | OUTPATIENT
Start: 2021-05-12

## 2021-05-21 DIAGNOSIS — E78.2 MIXED HYPERLIPIDEMIA: ICD-10-CM

## 2021-05-21 RX ORDER — ROSUVASTATIN CALCIUM 10 MG/1
TABLET, COATED ORAL
Qty: 30 TABLET | Refills: 5 | Status: SHIPPED | OUTPATIENT
Start: 2021-05-21 | End: 2021-11-08

## 2021-06-03 ENCOUNTER — APPOINTMENT (OUTPATIENT)
Dept: LAB | Facility: IMAGING CENTER | Age: 46
End: 2021-06-03
Payer: COMMERCIAL

## 2021-06-03 DIAGNOSIS — E11.9 TYPE 2 DIABETES MELLITUS WITHOUT COMPLICATION, WITHOUT LONG-TERM CURRENT USE OF INSULIN (HCC): ICD-10-CM

## 2021-06-03 LAB
ALBUMIN SERPL BCP-MCNC: 4.3 G/DL (ref 3.5–5)
ALP SERPL-CCNC: 71 U/L (ref 46–116)
ALT SERPL W P-5'-P-CCNC: 84 U/L (ref 12–78)
ANION GAP SERPL CALCULATED.3IONS-SCNC: 4 MMOL/L (ref 4–13)
AST SERPL W P-5'-P-CCNC: 39 U/L (ref 5–45)
BASOPHILS # BLD AUTO: 0.03 THOUSANDS/ΜL (ref 0–0.1)
BASOPHILS NFR BLD AUTO: 1 % (ref 0–1)
BILIRUB SERPL-MCNC: 0.56 MG/DL (ref 0.2–1)
BUN SERPL-MCNC: 12 MG/DL (ref 5–25)
CALCIUM SERPL-MCNC: 9.3 MG/DL (ref 8.3–10.1)
CHLORIDE SERPL-SCNC: 108 MMOL/L (ref 100–108)
CHOLEST SERPL-MCNC: 147 MG/DL (ref 50–200)
CO2 SERPL-SCNC: 27 MMOL/L (ref 21–32)
CREAT SERPL-MCNC: 0.9 MG/DL (ref 0.6–1.3)
CREAT UR-MCNC: 82.2 MG/DL
EOSINOPHIL # BLD AUTO: 0.14 THOUSAND/ΜL (ref 0–0.61)
EOSINOPHIL NFR BLD AUTO: 2 % (ref 0–6)
ERYTHROCYTE [DISTWIDTH] IN BLOOD BY AUTOMATED COUNT: 13.2 % (ref 11.6–15.1)
EST. AVERAGE GLUCOSE BLD GHB EST-MCNC: 217 MG/DL
GFR SERPL CREATININE-BSD FRML MDRD: 103 ML/MIN/1.73SQ M
GLUCOSE P FAST SERPL-MCNC: 140 MG/DL (ref 65–99)
HBA1C MFR BLD: 9.2 %
HCT VFR BLD AUTO: 47.8 % (ref 36.5–49.3)
HDLC SERPL-MCNC: 42 MG/DL
HGB BLD-MCNC: 15.6 G/DL (ref 12–17)
IMM GRANULOCYTES # BLD AUTO: 0.02 THOUSAND/UL (ref 0–0.2)
IMM GRANULOCYTES NFR BLD AUTO: 0 % (ref 0–2)
LDLC SERPL CALC-MCNC: 77 MG/DL (ref 0–100)
LYMPHOCYTES # BLD AUTO: 1.8 THOUSANDS/ΜL (ref 0.6–4.47)
LYMPHOCYTES NFR BLD AUTO: 31 % (ref 14–44)
MCH RBC QN AUTO: 29.7 PG (ref 26.8–34.3)
MCHC RBC AUTO-ENTMCNC: 32.6 G/DL (ref 31.4–37.4)
MCV RBC AUTO: 91 FL (ref 82–98)
MICROALBUMIN UR-MCNC: 5.2 MG/L (ref 0–20)
MICROALBUMIN/CREAT 24H UR: 6 MG/G CREATININE (ref 0–30)
MONOCYTES # BLD AUTO: 0.59 THOUSAND/ΜL (ref 0.17–1.22)
MONOCYTES NFR BLD AUTO: 10 % (ref 4–12)
NEUTROPHILS # BLD AUTO: 3.23 THOUSANDS/ΜL (ref 1.85–7.62)
NEUTS SEG NFR BLD AUTO: 56 % (ref 43–75)
NRBC BLD AUTO-RTO: 0 /100 WBCS
PLATELET # BLD AUTO: 208 THOUSANDS/UL (ref 149–390)
PMV BLD AUTO: 11.2 FL (ref 8.9–12.7)
POTASSIUM SERPL-SCNC: 4.1 MMOL/L (ref 3.5–5.3)
PROT SERPL-MCNC: 7.3 G/DL (ref 6.4–8.2)
RBC # BLD AUTO: 5.26 MILLION/UL (ref 3.88–5.62)
SODIUM SERPL-SCNC: 139 MMOL/L (ref 136–145)
TRIGL SERPL-MCNC: 141 MG/DL
TSH SERPL DL<=0.05 MIU/L-ACNC: 1.06 UIU/ML (ref 0.36–3.74)
WBC # BLD AUTO: 5.81 THOUSAND/UL (ref 4.31–10.16)

## 2021-06-03 PROCEDURE — 82043 UR ALBUMIN QUANTITATIVE: CPT | Performed by: FAMILY MEDICINE

## 2021-06-03 PROCEDURE — 84443 ASSAY THYROID STIM HORMONE: CPT

## 2021-06-03 PROCEDURE — 36415 COLL VENOUS BLD VENIPUNCTURE: CPT

## 2021-06-03 PROCEDURE — 85025 COMPLETE CBC W/AUTO DIFF WBC: CPT

## 2021-06-03 PROCEDURE — 3046F HEMOGLOBIN A1C LEVEL >9.0%: CPT | Performed by: FAMILY MEDICINE

## 2021-06-03 PROCEDURE — 80053 COMPREHEN METABOLIC PANEL: CPT

## 2021-06-03 PROCEDURE — 83036 HEMOGLOBIN GLYCOSYLATED A1C: CPT

## 2021-06-03 PROCEDURE — 80061 LIPID PANEL: CPT

## 2021-06-03 PROCEDURE — 82570 ASSAY OF URINE CREATININE: CPT | Performed by: FAMILY MEDICINE

## 2021-06-03 PROCEDURE — 3061F NEG MICROALBUMINURIA REV: CPT | Performed by: FAMILY MEDICINE

## 2021-06-04 ENCOUNTER — TELEPHONE (OUTPATIENT)
Dept: FAMILY MEDICINE CLINIC | Facility: CLINIC | Age: 46
End: 2021-06-04

## 2021-06-04 DIAGNOSIS — E11.9 TYPE 2 DIABETES MELLITUS WITHOUT COMPLICATION, WITHOUT LONG-TERM CURRENT USE OF INSULIN (HCC): Primary | ICD-10-CM

## 2021-06-04 RX ORDER — BLOOD SUGAR DIAGNOSTIC
STRIP MISCELLANEOUS
Qty: 100 STRIP | Refills: 1 | Status: SHIPPED | OUTPATIENT
Start: 2021-06-04 | End: 2021-06-08

## 2021-06-04 RX ORDER — LANCETS 28 GAUGE
EACH MISCELLANEOUS
Qty: 100 EACH | Refills: 1 | Status: SHIPPED | OUTPATIENT
Start: 2021-06-04

## 2021-06-04 RX ORDER — BLOOD-GLUCOSE METER
1 KIT MISCELLANEOUS 2 TIMES DAILY
Qty: 1 EACH | Refills: 0 | Status: SHIPPED | OUTPATIENT
Start: 2021-06-04 | End: 2021-06-11

## 2021-06-04 NOTE — TELEPHONE ENCOUNTER
----- Message from Reji Javier MD sent at 6/4/2021  7:05 AM EDT -----  His blood work came back showing A1c increased to 9 2  Please check with patient he has been taking his medication  Also check if he is on the prednisone  Or any other steroid  I want him to monitor his sugar fasting in the morning and 2 hours after supper  Keep his appointment on June 7th

## 2021-06-04 NOTE — TELEPHONE ENCOUNTER
Received fax from [employer] for prior auth through cover my meds for Free Style test strips  [facility] also gave script for free style glucometer and did not receive any prior auth for the machine  Please dispense glucometer and test supplies brand cover by insurance

## 2021-06-04 NOTE — TELEPHONE ENCOUNTER
Pt aware of results and is taking the metformin daily as directed  He is currently not taking any prednisone  He will need a monitor sent to pharmacy which I did send to provider for approval in order to test his sugars

## 2021-06-07 ENCOUNTER — TELEPHONE (OUTPATIENT)
Dept: FAMILY MEDICINE CLINIC | Facility: CLINIC | Age: 46
End: 2021-06-07

## 2021-06-07 ENCOUNTER — OFFICE VISIT (OUTPATIENT)
Dept: FAMILY MEDICINE CLINIC | Facility: CLINIC | Age: 46
End: 2021-06-07
Payer: COMMERCIAL

## 2021-06-07 VITALS
TEMPERATURE: 98.1 F | DIASTOLIC BLOOD PRESSURE: 80 MMHG | RESPIRATION RATE: 16 BRPM | SYSTOLIC BLOOD PRESSURE: 124 MMHG | HEIGHT: 71 IN | WEIGHT: 221.25 LBS | BODY MASS INDEX: 30.98 KG/M2 | HEART RATE: 84 BPM | OXYGEN SATURATION: 99 %

## 2021-06-07 DIAGNOSIS — I10 ESSENTIAL HYPERTENSION: ICD-10-CM

## 2021-06-07 DIAGNOSIS — E11.9 TYPE 2 DIABETES MELLITUS WITHOUT COMPLICATION, WITHOUT LONG-TERM CURRENT USE OF INSULIN (HCC): ICD-10-CM

## 2021-06-07 DIAGNOSIS — E11.9 TYPE 2 DIABETES MELLITUS WITHOUT COMPLICATION, WITHOUT LONG-TERM CURRENT USE OF INSULIN (HCC): Primary | ICD-10-CM

## 2021-06-07 DIAGNOSIS — J45.20 MILD INTERMITTENT ASTHMA WITHOUT COMPLICATION: ICD-10-CM

## 2021-06-07 DIAGNOSIS — Z23 ENCOUNTER FOR IMMUNIZATION: ICD-10-CM

## 2021-06-07 PROCEDURE — 3008F BODY MASS INDEX DOCD: CPT | Performed by: FAMILY MEDICINE

## 2021-06-07 PROCEDURE — 1036F TOBACCO NON-USER: CPT | Performed by: FAMILY MEDICINE

## 2021-06-07 PROCEDURE — 90750 HZV VACC RECOMBINANT IM: CPT

## 2021-06-07 PROCEDURE — 99214 OFFICE O/P EST MOD 30 MIN: CPT | Performed by: FAMILY MEDICINE

## 2021-06-07 PROCEDURE — 3725F SCREEN DEPRESSION PERFORMED: CPT | Performed by: FAMILY MEDICINE

## 2021-06-07 PROCEDURE — 90471 IMMUNIZATION ADMIN: CPT

## 2021-06-07 PROCEDURE — 3074F SYST BP LT 130 MM HG: CPT | Performed by: FAMILY MEDICINE

## 2021-06-07 PROCEDURE — 3079F DIAST BP 80-89 MM HG: CPT | Performed by: FAMILY MEDICINE

## 2021-06-07 RX ORDER — SEMAGLUTIDE 1.34 MG/ML
INJECTION, SOLUTION SUBCUTANEOUS
Qty: 3 ML | Refills: 0 | Status: SHIPPED | OUTPATIENT
Start: 2021-06-07 | End: 2021-06-11

## 2021-06-07 NOTE — ASSESSMENT & PLAN NOTE
Not well controlled  I am going to start him on Ozempic  Was discussed about side effects  Continue Metformin 1000mg daily  Begin home blood sugar monitoring   Recheck A1c in 3 months and follow up  Lab Results   Component Value Date    HGBA1C 9 2 (H) 06/03/2021

## 2021-06-07 NOTE — TELEPHONE ENCOUNTER
Patient was seen today, just left a message stating that his test strips need an authorization as does his Ozempic      Let him know if he needs to do anything No

## 2021-06-07 NOTE — TELEPHONE ENCOUNTER
I l[facility]m on Friday for [facility] to use any brand covered by insurance for glucometer and supplies  left a [facility]nd [facility] for [facility] today  Pt aware

## 2021-06-07 NOTE — PROGRESS NOTES
Assessment/Plan:    Type 2 diabetes mellitus without complication, without long-term current use of insulin ([address] )  Not well controlled  I am going to start him on Ozempic  Was discussed about side effects  Continue Metformin 1000mg daily  Begin home blood sugar monitoring  Recheck A1c in 3 months and follow up  Lab Results   Component Value Date    HGBA1C 9 2 (H) 06/03/2021       Essential hypertension  Stable  Continue same  Hyperlipidemia  Stable  Continue same  Problem List Items Addressed This Visit        Endocrine    Type 2 diabetes mellitus without complication, without long-term current use of insulin ([address] ) - Primary     Not well controlled  I am going to start him on Ozempic  Was discussed about side effects  Continue Metformin 1000mg daily  Begin home blood sugar monitoring  Recheck A1c in 3 months and follow up  Lab Results   Component Value Date    HGBA1C 9 2 (H) 06/03/2021            Relevant Medications    Semaglutide,0 25 or 0 5MG/DOS, (Ozempic, 0 25 or 0 5 MG/DOSE,) 2 MG/1 5ML SOPN       Respiratory    Asthma       Cardiovascular and Mediastinum    Essential hypertension     Stable  Continue same  Other    Encounter for immunization    Relevant Orders    Zoster Vaccine Recombinant IM (Completed)    BMI 30 0-30 9,adult            Subjective:      Patient ID: Keyanna Shepard is a 39 y o  male  Maren Rodriguez is a 39year old male diabetic presenting to the family medicine office for follow up for diabetes, hypertension, hyperlipidemia  He has no complaints today  His last A1c is 9 2, an increase from 6 6  He is taking Metformin 1000mg daily  He has improved his diet and exercise over the past year  His blood pressure is 124/80 on 25mg losartan  He denies any chest pain, shortness of breath, numbness or tingling in his hands or feet         The following portions of the patient's history were reviewed and updated as appropriate: allergies, current medications, past family history, past medical history, past social history, past surgical history and problem list     Review of Systems   Constitutional: Negative for chills, fatigue and fever  HENT: Negative for trouble swallowing  Eyes: Negative for visual disturbance  Respiratory: Negative for cough and shortness of breath  Cardiovascular: Positive for chest pain  Negative for palpitations  Gastrointestinal: Negative for abdominal pain, blood in stool and vomiting  Endocrine: Negative for cold intolerance, heat intolerance, polydipsia and polyuria  Genitourinary: Negative for difficulty urinating and dysuria  Musculoskeletal: Negative for gait problem  Skin: Negative for rash  Neurological: Negative for dizziness, syncope, numbness and headaches  Hematological: Negative for adenopathy  Psychiatric/Behavioral: Negative for behavioral problems  Objective:      /80 (BP Location: Left arm, Patient Position: Sitting, Cuff Size: Adult)   Pulse 84   Temp 98 1 °F (36 7 °C) (Tympanic)   Resp 16   Ht 5' 11" (1 803 m)   Wt 100 kg (221 lb 4 oz)   SpO2 99%   BMI 30 86 kg/m²          Physical Exam  Vitals signs and nursing note reviewed  Constitutional:       General: He is not in acute distress  Appearance: He is well-developed  He is not toxic-appearing  HENT:      Head: Normocephalic and atraumatic  Eyes:      Pupils: Pupils are equal, round, and reactive to light  Neck:      Musculoskeletal: Normal range of motion and neck supple  Cardiovascular:      Rate and Rhythm: Normal rate and regular rhythm  Heart sounds: Normal heart sounds  Pulmonary:      Effort: Pulmonary effort is normal       Breath sounds: Normal breath sounds  Abdominal:      General: Bowel sounds are normal       Palpations: Abdomen is soft  Musculoskeletal: Normal range of motion  Lymphadenopathy:      Cervical: No cervical adenopathy  Skin:     General: Skin is warm and dry        Findings: No lesion or rash    Neurological:      General: No focal deficit present  Mental Status: He is alert and oriented to person, place, and time  Cranial Nerves: No cranial nerve deficit  BMI Counseling: Body mass index is 30 86 kg/m²  The BMI is above normal  Nutrition recommendations include reducing portion sizes, decreasing overall calorie intake, 3-5 servings of fruits/vegetables daily and reducing fast food intake  Exercise recommendations include moderate aerobic physical activity for 150 minutes/week

## 2021-06-07 NOTE — TELEPHONE ENCOUNTER
I called patient since he was worried about this and informed him we will do the auth and keep him informed

## 2021-06-07 NOTE — TELEPHONE ENCOUNTER
Pt left a message Friday and said his test strips needs an "override" to put through    Also asks if he should keep his Monday appt or wait a few days so he can get some BS numbers

## 2021-06-08 ENCOUNTER — TELEPHONE (OUTPATIENT)
Dept: FAMILY MEDICINE CLINIC | Facility: CLINIC | Age: 46
End: 2021-06-08

## 2021-06-08 DIAGNOSIS — E11.9 TYPE 2 DIABETES MELLITUS WITHOUT COMPLICATION, WITHOUT LONG-TERM CURRENT USE OF INSULIN (HCC): Primary | ICD-10-CM

## 2021-06-08 NOTE — TELEPHONE ENCOUNTER
I am unable to find [facility] so I put in a generic ref for meter and test strips   Sent to provider to approve

## 2021-06-08 NOTE — TELEPHONE ENCOUNTER
There was a lot of back and forth regarding patient's glucose meter and test strips but left a message stating that [employer] is covered by  His insurance so is asking for a meter and test strips

## 2021-06-08 NOTE — TELEPHONE ENCOUNTER
Pt send in a message through his my chart requesting colo guard  I did discuss with Dr Jen Ahuja and he was ok with ordering the test because pt said it is covered by insurance even under the age of 48  I sent all his information to [employer] at which time they will call him to verify all his information before sending the kit

## 2021-06-10 DIAGNOSIS — E11.9 TYPE 2 DIABETES MELLITUS WITHOUT COMPLICATION, WITHOUT LONG-TERM CURRENT USE OF INSULIN (HCC): ICD-10-CM

## 2021-06-10 RX ORDER — GLUCOSAMINE HCL/CHONDROITIN SU 500-400 MG
CAPSULE ORAL
Qty: 100 STRIP | Refills: 2 | Status: SHIPPED | OUTPATIENT
Start: 2021-06-10 | End: 2021-10-04

## 2021-06-10 NOTE — TELEPHONE ENCOUNTER
Phone call from Amparo Salter, states the pharmacy still did not receive the order for his glucose meter & test strips  I spoke with Ping, she will speak to the Dr & have the pres sent in asap  Pt aware

## 2021-06-11 RX ORDER — BLOOD-GLUCOSE METER
KIT MISCELLANEOUS
Qty: 1 EACH | Refills: 0 | Status: SHIPPED | OUTPATIENT
Start: 2021-06-11

## 2021-06-11 RX ORDER — SEMAGLUTIDE 1.34 MG/ML
INJECTION, SOLUTION SUBCUTANEOUS
Qty: 4.5 ML | Refills: 1 | Status: SHIPPED | OUTPATIENT
Start: 2021-06-11 | End: 2021-07-09

## 2021-07-09 ENCOUNTER — OFFICE VISIT (OUTPATIENT)
Dept: FAMILY MEDICINE CLINIC | Facility: CLINIC | Age: 46
End: 2021-07-09
Payer: COMMERCIAL

## 2021-07-09 VITALS
HEIGHT: 71 IN | DIASTOLIC BLOOD PRESSURE: 80 MMHG | RESPIRATION RATE: 16 BRPM | HEART RATE: 86 BPM | TEMPERATURE: 98.2 F | BODY MASS INDEX: 31.11 KG/M2 | WEIGHT: 222.2 LBS | OXYGEN SATURATION: 98 % | SYSTOLIC BLOOD PRESSURE: 120 MMHG

## 2021-07-09 DIAGNOSIS — I10 ESSENTIAL HYPERTENSION: ICD-10-CM

## 2021-07-09 DIAGNOSIS — E11.9 TYPE 2 DIABETES MELLITUS WITHOUT COMPLICATION, WITHOUT LONG-TERM CURRENT USE OF INSULIN (HCC): Primary | ICD-10-CM

## 2021-07-09 LAB — SL AMB POCT HEMOGLOBIN AIC: 8.3 (ref ?–6.5)

## 2021-07-09 PROCEDURE — 3052F HG A1C>EQUAL 8.0%<EQUAL 9.0%: CPT | Performed by: FAMILY MEDICINE

## 2021-07-09 PROCEDURE — 3079F DIAST BP 80-89 MM HG: CPT | Performed by: FAMILY MEDICINE

## 2021-07-09 PROCEDURE — 99214 OFFICE O/P EST MOD 30 MIN: CPT | Performed by: FAMILY MEDICINE

## 2021-07-09 PROCEDURE — 3008F BODY MASS INDEX DOCD: CPT | Performed by: FAMILY MEDICINE

## 2021-07-09 PROCEDURE — 1036F TOBACCO NON-USER: CPT | Performed by: FAMILY MEDICINE

## 2021-07-09 PROCEDURE — 3074F SYST BP LT 130 MM HG: CPT | Performed by: FAMILY MEDICINE

## 2021-07-09 PROCEDURE — 83036 HEMOGLOBIN GLYCOSYLATED A1C: CPT | Performed by: FAMILY MEDICINE

## 2021-07-09 RX ORDER — SEMAGLUTIDE 1.34 MG/ML
1 INJECTION, SOLUTION SUBCUTANEOUS WEEKLY
Qty: 3 ML | Refills: 3 | Status: SHIPPED | OUTPATIENT
Start: 2021-07-09 | End: 2021-11-02

## 2021-07-09 NOTE — ASSESSMENT & PLAN NOTE
Lab Results   Component Value Date    HGBA1C 8 3 (A) 07/09/2021   Not well controlled but improving  Could I am going to increase of sent back to 1 mg weekly  Discussed about possible side effects  Continue to follow low carb diet and regular exercise  Come back in 3 months

## 2021-07-09 NOTE — PROGRESS NOTES
Assessment/Plan:  Type 2 diabetes mellitus without complication, without long-term current use of insulin (HCC)    Lab Results   Component Value Date    HGBA1C 8 3 (A) 07/09/2021   Not well controlled but improving  Could I am going to increase of sent back to 1 mg weekly  Discussed about possible side effects  Continue to follow low carb diet and regular exercise  Come back in 3 months  Essential hypertension    Well controlled  Continue same  Will continue to monitor  Diagnoses and all orders for this visit:    Type 2 diabetes mellitus without complication, without long-term current use of insulin (HCC)  -     semaglutide, 1 mg/dose, (Ozempic, 1 MG/DOSE,) 4 MG/3ML SOPN injection pen; Inject 0 75 mL (1 mg total) under the skin once a week  -     CBC and differential; Future  -     Comprehensive metabolic panel; Future  -     Hemoglobin A1C; Future  -     Lipid Panel with Direct LDL reflex; Future  -     TSH, 3rd generation with Free T4 reflex; Future  -     POCT hemoglobin A1c    Essential hypertension          There are no Patient Instructions on file for this visit  Return in about 3 months (around 10/9/2021)  Subjective:      Patient ID: Mari Thurman is a 39 y o  male  Chief Complaint   Patient presents with    Diabetes    Results     pt uploaded bs readings into his my chart         He is here today for follow-up multiple medical problems  He was seen a month ago for diabetes mellitus type 2 not well controlled  He was started on Ozempic 0 25 mg weekly for 2 weeks and then increase to 0 5 mg weekly  He has been using his medication  Denies any side effects from his medication  His blood sugar measurements continues to be elevated but improved from before  Repeated A1c in the office today showed improvement to 8 3        The following portions of the patient's history were reviewed and updated as appropriate: allergies, current medications, past family history, past medical history, past social history, past surgical history and problem list     Review of Systems   Constitutional: Negative for chills and fever  HENT: Negative for trouble swallowing  Eyes: Negative for visual disturbance  Respiratory: Negative for cough and shortness of breath  Cardiovascular: Negative for chest pain and palpitations  Gastrointestinal: Negative for abdominal pain, blood in stool and vomiting  Endocrine: Negative for cold intolerance and heat intolerance  Genitourinary: Negative for difficulty urinating and dysuria  Musculoskeletal: Negative for gait problem  Skin: Negative for rash  Neurological: Negative for dizziness, syncope and headaches  Hematological: Negative for adenopathy  Psychiatric/Behavioral: Negative for behavioral problems           Current Outpatient Medications   Medication Sig Dispense Refill    aspirin (ECOTRIN LOW STRENGTH) 81 mg EC tablet Take 81 mg by mouth daily      azelastine (ASTELIN) 0 1 % nasal spray       Blood Glucose Monitoring Suppl (FreeStyle Lite) JCARLOS USE 1 EACH 2 TIMES A DAY 1 each 0    Blood Glucose Monitoring Suppl w/Device KIT Use 2 (two) times a day 1 kit 0    Cholecalciferol (VITAMIN D3) 2000 units capsule Take 2,000 Units by mouth daily      Glucose Blood (BLOOD GLUCOSE TEST STRIPS) STRP Pt tests blood sugar twice daily 100 strip 2    Lancets (freestyle) lancets Pt tests twice daily 100 each 1    losartan (COZAAR) 25 mg tablet TAKE 1 TABLET BY MOUTH EVERY DAY 30 tablet 3    metFORMIN (GLUCOPHAGE-XR) 500 mg 24 hr tablet TAKE 2 TABLETS BY MOUTH EVERY DAY 60 tablet 2    rosuvastatin (CRESTOR) 10 MG tablet TAKE 1 TABLET BY MOUTH EVERY DAY 30 tablet 5    triamcinolone (KENALOG) 0 5 % cream Apply topically 3 (three) times a day 30 g 1    albuterol (VENTOLIN HFA) 90 mcg/act inhaler Inhale 2 puffs every 6 (six) hours as needed for wheezing (Patient not taking: Reported on 4/7/2021) 18 g 1    semaglutide, 1 mg/dose, (Ozempic, 1 MG/DOSE,) 4 MG/3ML SOPN injection pen Inject 0 75 mL (1 mg total) under the skin once a week 3 mL 3    valACYclovir (VALTREX) 1,000 mg tablet Take 1 tablet (1,000 mg total) by mouth 3 (three) times a day for 7 days 21 tablet 0     No current facility-administered medications for this visit  Objective:    /80 (BP Location: Left arm, Patient Position: Sitting, Cuff Size: Large)   Pulse 86   Temp 98 2 °F (36 8 °C) (Tympanic)   Resp 16   Ht 5' 11" (1 803 m)   Wt 101 kg (222 lb 3 2 oz)   SpO2 98%   BMI 30 99 kg/m²        Physical Exam  Vitals and nursing note reviewed  Constitutional:       Appearance: He is well-developed  HENT:      Head: Normocephalic and atraumatic  Eyes:      Pupils: Pupils are equal, round, and reactive to light  Cardiovascular:      Rate and Rhythm: Normal rate and regular rhythm  Heart sounds: Normal heart sounds  Pulmonary:      Effort: Pulmonary effort is normal       Breath sounds: Normal breath sounds  Abdominal:      General: Bowel sounds are normal       Palpations: Abdomen is soft  Musculoskeletal:         General: Normal range of motion  Cervical back: Normal range of motion and neck supple  Lymphadenopathy:      Cervical: No cervical adenopathy  Skin:     General: Skin is warm  Findings: No rash  Neurological:      Mental Status: He is alert and oriented to person, place, and time  Cranial Nerves: No cranial nerve deficit                  Myla Baker MD

## 2021-08-05 DIAGNOSIS — E11.9 TYPE 2 DIABETES MELLITUS WITHOUT COMPLICATION, WITHOUT LONG-TERM CURRENT USE OF INSULIN (HCC): ICD-10-CM

## 2021-08-05 RX ORDER — METFORMIN HYDROCHLORIDE 500 MG/1
TABLET, EXTENDED RELEASE ORAL
Qty: 60 TABLET | Refills: 2 | Status: SHIPPED | OUTPATIENT
Start: 2021-08-05 | End: 2021-10-25

## 2021-08-25 ENCOUNTER — CLINICAL SUPPORT (OUTPATIENT)
Dept: FAMILY MEDICINE CLINIC | Facility: CLINIC | Age: 46
End: 2021-08-25
Payer: COMMERCIAL

## 2021-08-25 DIAGNOSIS — Z23 ENCOUNTER FOR IMMUNIZATION: Primary | ICD-10-CM

## 2021-08-25 PROCEDURE — 90471 IMMUNIZATION ADMIN: CPT

## 2021-08-25 PROCEDURE — 90750 HZV VACC RECOMBINANT IM: CPT

## 2021-09-07 DIAGNOSIS — I10 ESSENTIAL HYPERTENSION: ICD-10-CM

## 2021-09-08 RX ORDER — LOSARTAN POTASSIUM 25 MG/1
TABLET ORAL
Qty: 30 TABLET | Refills: 3 | Status: SHIPPED | OUTPATIENT
Start: 2021-09-08 | End: 2022-01-10

## 2021-10-04 DIAGNOSIS — E11.9 TYPE 2 DIABETES MELLITUS WITHOUT COMPLICATION, WITHOUT LONG-TERM CURRENT USE OF INSULIN (HCC): ICD-10-CM

## 2021-10-04 RX ORDER — BLOOD SUGAR DIAGNOSTIC
STRIP MISCELLANEOUS
Qty: 100 STRIP | Refills: 2 | Status: SHIPPED | OUTPATIENT
Start: 2021-10-04 | End: 2022-02-21

## 2021-10-24 DIAGNOSIS — E11.9 TYPE 2 DIABETES MELLITUS WITHOUT COMPLICATION, WITHOUT LONG-TERM CURRENT USE OF INSULIN (HCC): ICD-10-CM

## 2021-10-25 RX ORDER — METFORMIN HYDROCHLORIDE 500 MG/1
TABLET, EXTENDED RELEASE ORAL
Qty: 60 TABLET | Refills: 2 | Status: SHIPPED | OUTPATIENT
Start: 2021-10-25 | End: 2022-01-10

## 2021-10-29 ENCOUNTER — APPOINTMENT (OUTPATIENT)
Dept: LAB | Facility: IMAGING CENTER | Age: 46
End: 2021-10-29
Payer: COMMERCIAL

## 2021-10-29 DIAGNOSIS — E11.9 TYPE 2 DIABETES MELLITUS WITHOUT COMPLICATION, WITHOUT LONG-TERM CURRENT USE OF INSULIN (HCC): ICD-10-CM

## 2021-10-29 LAB
ALBUMIN SERPL BCP-MCNC: 4.2 G/DL (ref 3.5–5)
ALP SERPL-CCNC: 75 U/L (ref 46–116)
ALT SERPL W P-5'-P-CCNC: 50 U/L (ref 12–78)
ANION GAP SERPL CALCULATED.3IONS-SCNC: 4 MMOL/L (ref 4–13)
AST SERPL W P-5'-P-CCNC: 24 U/L (ref 5–45)
BASOPHILS # BLD AUTO: 0.04 THOUSANDS/ΜL (ref 0–0.1)
BASOPHILS NFR BLD AUTO: 1 % (ref 0–1)
BILIRUB SERPL-MCNC: 0.63 MG/DL (ref 0.2–1)
BUN SERPL-MCNC: 8 MG/DL (ref 5–25)
CALCIUM SERPL-MCNC: 9.3 MG/DL (ref 8.3–10.1)
CHLORIDE SERPL-SCNC: 105 MMOL/L (ref 100–108)
CHOLEST SERPL-MCNC: 138 MG/DL (ref 50–200)
CO2 SERPL-SCNC: 28 MMOL/L (ref 21–32)
CREAT SERPL-MCNC: 0.92 MG/DL (ref 0.6–1.3)
EOSINOPHIL # BLD AUTO: 0.17 THOUSAND/ΜL (ref 0–0.61)
EOSINOPHIL NFR BLD AUTO: 2 % (ref 0–6)
ERYTHROCYTE [DISTWIDTH] IN BLOOD BY AUTOMATED COUNT: 13.5 % (ref 11.6–15.1)
EST. AVERAGE GLUCOSE BLD GHB EST-MCNC: 123 MG/DL
GFR SERPL CREATININE-BSD FRML MDRD: 99 ML/MIN/1.73SQ M
GLUCOSE P FAST SERPL-MCNC: 101 MG/DL (ref 65–99)
HBA1C MFR BLD: 5.9 %
HCT VFR BLD AUTO: 48.4 % (ref 36.5–49.3)
HDLC SERPL-MCNC: 45 MG/DL
HGB BLD-MCNC: 15.9 G/DL (ref 12–17)
IMM GRANULOCYTES # BLD AUTO: 0.01 THOUSAND/UL (ref 0–0.2)
IMM GRANULOCYTES NFR BLD AUTO: 0 % (ref 0–2)
LDLC SERPL CALC-MCNC: 67 MG/DL (ref 0–100)
LYMPHOCYTES # BLD AUTO: 2.25 THOUSANDS/ΜL (ref 0.6–4.47)
LYMPHOCYTES NFR BLD AUTO: 32 % (ref 14–44)
MCH RBC QN AUTO: 30.1 PG (ref 26.8–34.3)
MCHC RBC AUTO-ENTMCNC: 32.9 G/DL (ref 31.4–37.4)
MCV RBC AUTO: 92 FL (ref 82–98)
MONOCYTES # BLD AUTO: 0.6 THOUSAND/ΜL (ref 0.17–1.22)
MONOCYTES NFR BLD AUTO: 9 % (ref 4–12)
NEUTROPHILS # BLD AUTO: 3.99 THOUSANDS/ΜL (ref 1.85–7.62)
NEUTS SEG NFR BLD AUTO: 56 % (ref 43–75)
NRBC BLD AUTO-RTO: 0 /100 WBCS
PLATELET # BLD AUTO: 221 THOUSANDS/UL (ref 149–390)
PMV BLD AUTO: 10.8 FL (ref 8.9–12.7)
POTASSIUM SERPL-SCNC: 3.9 MMOL/L (ref 3.5–5.3)
PROT SERPL-MCNC: 7.6 G/DL (ref 6.4–8.2)
RBC # BLD AUTO: 5.29 MILLION/UL (ref 3.88–5.62)
SODIUM SERPL-SCNC: 137 MMOL/L (ref 136–145)
TRIGL SERPL-MCNC: 130 MG/DL
TSH SERPL DL<=0.05 MIU/L-ACNC: 0.99 UIU/ML (ref 0.36–3.74)
WBC # BLD AUTO: 7.06 THOUSAND/UL (ref 4.31–10.16)

## 2021-10-29 PROCEDURE — 80061 LIPID PANEL: CPT

## 2021-10-29 PROCEDURE — 85025 COMPLETE CBC W/AUTO DIFF WBC: CPT

## 2021-10-29 PROCEDURE — 36415 COLL VENOUS BLD VENIPUNCTURE: CPT

## 2021-10-29 PROCEDURE — 80053 COMPREHEN METABOLIC PANEL: CPT

## 2021-10-29 PROCEDURE — 84443 ASSAY THYROID STIM HORMONE: CPT

## 2021-10-29 PROCEDURE — 83036 HEMOGLOBIN GLYCOSYLATED A1C: CPT

## 2021-11-01 ENCOUNTER — TELEPHONE (OUTPATIENT)
Dept: FAMILY MEDICINE CLINIC | Facility: CLINIC | Age: 46
End: 2021-11-01

## 2021-11-02 DIAGNOSIS — E11.9 TYPE 2 DIABETES MELLITUS WITHOUT COMPLICATION, WITHOUT LONG-TERM CURRENT USE OF INSULIN (HCC): ICD-10-CM

## 2021-11-02 RX ORDER — SEMAGLUTIDE 1.34 MG/ML
1 INJECTION, SOLUTION SUBCUTANEOUS WEEKLY
Qty: 3 ML | Refills: 3 | Status: SHIPPED | OUTPATIENT
Start: 2021-11-02 | End: 2022-02-28

## 2021-11-05 ENCOUNTER — OFFICE VISIT (OUTPATIENT)
Dept: FAMILY MEDICINE CLINIC | Facility: CLINIC | Age: 46
End: 2021-11-05
Payer: COMMERCIAL

## 2021-11-05 VITALS
DIASTOLIC BLOOD PRESSURE: 86 MMHG | WEIGHT: 216.8 LBS | HEART RATE: 85 BPM | RESPIRATION RATE: 16 BRPM | HEIGHT: 71 IN | BODY MASS INDEX: 30.35 KG/M2 | OXYGEN SATURATION: 98 % | SYSTOLIC BLOOD PRESSURE: 120 MMHG | TEMPERATURE: 97.7 F

## 2021-11-05 DIAGNOSIS — I10 ESSENTIAL HYPERTENSION: ICD-10-CM

## 2021-11-05 DIAGNOSIS — Z00.00 HEALTHCARE MAINTENANCE: Primary | ICD-10-CM

## 2021-11-05 DIAGNOSIS — J45.20 MILD INTERMITTENT ASTHMA WITHOUT COMPLICATION: ICD-10-CM

## 2021-11-05 DIAGNOSIS — E11.9 TYPE 2 DIABETES MELLITUS WITHOUT COMPLICATION, WITHOUT LONG-TERM CURRENT USE OF INSULIN (HCC): ICD-10-CM

## 2021-11-05 PROCEDURE — 3074F SYST BP LT 130 MM HG: CPT | Performed by: FAMILY MEDICINE

## 2021-11-05 PROCEDURE — 1036F TOBACCO NON-USER: CPT | Performed by: FAMILY MEDICINE

## 2021-11-05 PROCEDURE — 3008F BODY MASS INDEX DOCD: CPT | Performed by: FAMILY MEDICINE

## 2021-11-05 PROCEDURE — 99396 PREV VISIT EST AGE 40-64: CPT | Performed by: FAMILY MEDICINE

## 2021-11-05 PROCEDURE — 3079F DIAST BP 80-89 MM HG: CPT | Performed by: FAMILY MEDICINE

## 2021-11-06 DIAGNOSIS — E78.2 MIXED HYPERLIPIDEMIA: ICD-10-CM

## 2021-11-08 RX ORDER — ROSUVASTATIN CALCIUM 10 MG/1
TABLET, COATED ORAL
Qty: 30 TABLET | Refills: 5 | Status: SHIPPED | OUTPATIENT
Start: 2021-11-08 | End: 2022-05-24

## 2022-01-09 DIAGNOSIS — E11.9 TYPE 2 DIABETES MELLITUS WITHOUT COMPLICATION, WITHOUT LONG-TERM CURRENT USE OF INSULIN (HCC): ICD-10-CM

## 2022-01-09 DIAGNOSIS — I10 ESSENTIAL HYPERTENSION: ICD-10-CM

## 2022-01-10 RX ORDER — METFORMIN HYDROCHLORIDE 500 MG/1
TABLET, EXTENDED RELEASE ORAL
Qty: 60 TABLET | Refills: 2 | Status: SHIPPED | OUTPATIENT
Start: 2022-01-10 | End: 2022-04-08

## 2022-01-10 RX ORDER — LOSARTAN POTASSIUM 25 MG/1
TABLET ORAL
Qty: 30 TABLET | Refills: 3 | Status: SHIPPED | OUTPATIENT
Start: 2022-01-10 | End: 2022-05-11

## 2022-01-10 NOTE — TELEPHONE ENCOUNTER
Pharmacy requesting refill on metformin  Last seen 11/5/21  Has appt 5/6/22  Medication sent to pharmacy

## 2022-01-10 NOTE — TELEPHONE ENCOUNTER
Pharmacy requesting refill on losartan  Last seen 11/5/21  Has appt 5/6/22  Medication sent to pharmacy

## 2022-02-21 DIAGNOSIS — E11.9 TYPE 2 DIABETES MELLITUS WITHOUT COMPLICATION, WITHOUT LONG-TERM CURRENT USE OF INSULIN (HCC): ICD-10-CM

## 2022-02-21 RX ORDER — BLOOD SUGAR DIAGNOSTIC
STRIP MISCELLANEOUS
Qty: 50 STRIP | Refills: 5 | Status: SHIPPED | OUTPATIENT
Start: 2022-02-21

## 2022-02-26 DIAGNOSIS — E11.9 TYPE 2 DIABETES MELLITUS WITHOUT COMPLICATION, WITHOUT LONG-TERM CURRENT USE OF INSULIN (HCC): ICD-10-CM

## 2022-02-28 RX ORDER — SEMAGLUTIDE 1.34 MG/ML
1 INJECTION, SOLUTION SUBCUTANEOUS WEEKLY
Qty: 3 ML | Refills: 3 | Status: SHIPPED | OUTPATIENT
Start: 2022-02-28 | End: 2022-06-21

## 2022-04-08 DIAGNOSIS — E11.9 TYPE 2 DIABETES MELLITUS WITHOUT COMPLICATION, WITHOUT LONG-TERM CURRENT USE OF INSULIN (HCC): ICD-10-CM

## 2022-04-08 RX ORDER — METFORMIN HYDROCHLORIDE 500 MG/1
TABLET, EXTENDED RELEASE ORAL
Qty: 60 TABLET | Refills: 1 | Status: SHIPPED | OUTPATIENT
Start: 2022-04-08 | End: 2022-05-09

## 2022-05-07 DIAGNOSIS — E11.9 TYPE 2 DIABETES MELLITUS WITHOUT COMPLICATION, WITHOUT LONG-TERM CURRENT USE OF INSULIN (HCC): ICD-10-CM

## 2022-05-09 RX ORDER — METFORMIN HYDROCHLORIDE 500 MG/1
TABLET, EXTENDED RELEASE ORAL
Qty: 60 TABLET | Refills: 1 | Status: SHIPPED | OUTPATIENT
Start: 2022-05-09 | End: 2022-06-06

## 2022-05-09 NOTE — TELEPHONE ENCOUNTER
Pharmacy requesting refill on metformin  Last seen 11/5/21  Has appt 5/16/22  Medication sent to pharmacy

## 2022-05-11 DIAGNOSIS — I10 ESSENTIAL HYPERTENSION: ICD-10-CM

## 2022-05-11 PROCEDURE — 4010F ACE/ARB THERAPY RXD/TAKEN: CPT | Performed by: FAMILY MEDICINE

## 2022-05-11 RX ORDER — LOSARTAN POTASSIUM 25 MG/1
TABLET ORAL
Qty: 30 TABLET | Refills: 0 | Status: SHIPPED | OUTPATIENT
Start: 2022-05-11 | End: 2022-06-06

## 2022-05-14 ENCOUNTER — APPOINTMENT (OUTPATIENT)
Dept: LAB | Facility: IMAGING CENTER | Age: 47
End: 2022-05-14
Payer: COMMERCIAL

## 2022-05-14 DIAGNOSIS — E11.9 TYPE 2 DIABETES MELLITUS WITHOUT COMPLICATION, WITHOUT LONG-TERM CURRENT USE OF INSULIN (HCC): ICD-10-CM

## 2022-05-14 LAB
ALBUMIN SERPL BCP-MCNC: 4 G/DL (ref 3.5–5)
ALP SERPL-CCNC: 74 U/L (ref 46–116)
ALT SERPL W P-5'-P-CCNC: 37 U/L (ref 12–78)
ANION GAP SERPL CALCULATED.3IONS-SCNC: 7 MMOL/L (ref 4–13)
AST SERPL W P-5'-P-CCNC: 22 U/L (ref 5–45)
BASOPHILS # BLD AUTO: 0.02 THOUSANDS/ΜL (ref 0–0.1)
BASOPHILS NFR BLD AUTO: 0 % (ref 0–1)
BILIRUB SERPL-MCNC: 0.73 MG/DL (ref 0.2–1)
BUN SERPL-MCNC: 15 MG/DL (ref 5–25)
CALCIUM SERPL-MCNC: 9.2 MG/DL (ref 8.3–10.1)
CHLORIDE SERPL-SCNC: 108 MMOL/L (ref 100–108)
CHOLEST SERPL-MCNC: 114 MG/DL
CO2 SERPL-SCNC: 25 MMOL/L (ref 21–32)
CREAT SERPL-MCNC: 0.96 MG/DL (ref 0.6–1.3)
EOSINOPHIL # BLD AUTO: 0.23 THOUSAND/ΜL (ref 0–0.61)
EOSINOPHIL NFR BLD AUTO: 4 % (ref 0–6)
ERYTHROCYTE [DISTWIDTH] IN BLOOD BY AUTOMATED COUNT: 13.2 % (ref 11.6–15.1)
EST. AVERAGE GLUCOSE BLD GHB EST-MCNC: 126 MG/DL
GFR SERPL CREATININE-BSD FRML MDRD: 94 ML/MIN/1.73SQ M
GLUCOSE P FAST SERPL-MCNC: 100 MG/DL (ref 65–99)
HBA1C MFR BLD: 6 %
HCT VFR BLD AUTO: 46 % (ref 36.5–49.3)
HDLC SERPL-MCNC: 33 MG/DL
HGB BLD-MCNC: 15.2 G/DL (ref 12–17)
IMM GRANULOCYTES # BLD AUTO: 0.02 THOUSAND/UL (ref 0–0.2)
IMM GRANULOCYTES NFR BLD AUTO: 0 % (ref 0–2)
LDLC SERPL CALC-MCNC: 58 MG/DL (ref 0–100)
LYMPHOCYTES # BLD AUTO: 2.15 THOUSANDS/ΜL (ref 0.6–4.47)
LYMPHOCYTES NFR BLD AUTO: 34 % (ref 14–44)
MCH RBC QN AUTO: 29.7 PG (ref 26.8–34.3)
MCHC RBC AUTO-ENTMCNC: 33 G/DL (ref 31.4–37.4)
MCV RBC AUTO: 90 FL (ref 82–98)
MONOCYTES # BLD AUTO: 0.51 THOUSAND/ΜL (ref 0.17–1.22)
MONOCYTES NFR BLD AUTO: 8 % (ref 4–12)
NEUTROPHILS # BLD AUTO: 3.4 THOUSANDS/ΜL (ref 1.85–7.62)
NEUTS SEG NFR BLD AUTO: 54 % (ref 43–75)
NRBC BLD AUTO-RTO: 0 /100 WBCS
PLATELET # BLD AUTO: 227 THOUSANDS/UL (ref 149–390)
PMV BLD AUTO: 11 FL (ref 8.9–12.7)
POTASSIUM SERPL-SCNC: 4 MMOL/L (ref 3.5–5.3)
PROT SERPL-MCNC: 7.4 G/DL (ref 6.4–8.2)
RBC # BLD AUTO: 5.11 MILLION/UL (ref 3.88–5.62)
SODIUM SERPL-SCNC: 140 MMOL/L (ref 136–145)
TRIGL SERPL-MCNC: 116 MG/DL
TSH SERPL DL<=0.05 MIU/L-ACNC: 0.46 UIU/ML (ref 0.45–4.5)
WBC # BLD AUTO: 6.33 THOUSAND/UL (ref 4.31–10.16)

## 2022-05-14 PROCEDURE — 84443 ASSAY THYROID STIM HORMONE: CPT

## 2022-05-14 PROCEDURE — 80053 COMPREHEN METABOLIC PANEL: CPT

## 2022-05-14 PROCEDURE — 3044F HG A1C LEVEL LT 7.0%: CPT | Performed by: FAMILY MEDICINE

## 2022-05-14 PROCEDURE — 85025 COMPLETE CBC W/AUTO DIFF WBC: CPT

## 2022-05-14 PROCEDURE — 83036 HEMOGLOBIN GLYCOSYLATED A1C: CPT

## 2022-05-14 PROCEDURE — 80061 LIPID PANEL: CPT

## 2022-05-14 PROCEDURE — 36415 COLL VENOUS BLD VENIPUNCTURE: CPT

## 2022-05-23 ENCOUNTER — OFFICE VISIT (OUTPATIENT)
Dept: FAMILY MEDICINE CLINIC | Facility: CLINIC | Age: 47
End: 2022-05-23
Payer: COMMERCIAL

## 2022-05-23 ENCOUNTER — TELEPHONE (OUTPATIENT)
Dept: FAMILY MEDICINE CLINIC | Facility: CLINIC | Age: 47
End: 2022-05-23

## 2022-05-23 VITALS
WEIGHT: 212.4 LBS | BODY MASS INDEX: 29.73 KG/M2 | HEART RATE: 91 BPM | TEMPERATURE: 98.5 F | HEIGHT: 71 IN | SYSTOLIC BLOOD PRESSURE: 120 MMHG | DIASTOLIC BLOOD PRESSURE: 76 MMHG | OXYGEN SATURATION: 99 % | RESPIRATION RATE: 16 BRPM

## 2022-05-23 DIAGNOSIS — I10 ESSENTIAL HYPERTENSION: ICD-10-CM

## 2022-05-23 DIAGNOSIS — E78.2 MIXED HYPERLIPIDEMIA: ICD-10-CM

## 2022-05-23 DIAGNOSIS — J45.20 MILD INTERMITTENT ASTHMA WITHOUT COMPLICATION: ICD-10-CM

## 2022-05-23 DIAGNOSIS — E11.9 TYPE 2 DIABETES MELLITUS WITHOUT COMPLICATION, WITHOUT LONG-TERM CURRENT USE OF INSULIN (HCC): Primary | ICD-10-CM

## 2022-05-23 PROCEDURE — 1036F TOBACCO NON-USER: CPT | Performed by: FAMILY MEDICINE

## 2022-05-23 PROCEDURE — 3074F SYST BP LT 130 MM HG: CPT | Performed by: FAMILY MEDICINE

## 2022-05-23 PROCEDURE — 3078F DIAST BP <80 MM HG: CPT | Performed by: FAMILY MEDICINE

## 2022-05-23 PROCEDURE — 3725F SCREEN DEPRESSION PERFORMED: CPT | Performed by: FAMILY MEDICINE

## 2022-05-23 PROCEDURE — 3008F BODY MASS INDEX DOCD: CPT | Performed by: FAMILY MEDICINE

## 2022-05-23 PROCEDURE — 99214 OFFICE O/P EST MOD 30 MIN: CPT | Performed by: FAMILY MEDICINE

## 2022-05-23 NOTE — PROGRESS NOTES
Diabetic Foot Exam    Patient's shoes and socks removed  Right Foot/Ankle   Right Foot Inspection  Skin Exam: skin normal and skin intact  No dry skin, no warmth, no callus, no erythema, no maceration, no abnormal color, no pre-ulcer, no ulcer and no callus  Toe Exam: ROM and strength within normal limits  Sensory   Monofilament testing: intact    Vascular  Capillary refills: < 3 seconds  The right DP pulse is 2+  Left Foot/Ankle  Left Foot Inspection  Skin Exam: skin normal and skin intact  No dry skin, no warmth, no erythema, no maceration, normal color, no pre-ulcer, no ulcer and no callus  Toe Exam: ROM and strength within normal limits  Sensory   Monofilament testing: intact    Vascular  Capillary refills: < 3 seconds  The left DP pulse is 2+       Assign Risk Category  No deformity present  No loss of protective sensation  No weak pulses  Risk: 0

## 2022-05-23 NOTE — PROGRESS NOTES
Diabetic Foot Exam    Patient's shoes and socks removed  Right Foot/Ankle   Right Foot Inspection  Skin Exam: skin normal and skin intact  No dry skin, no warmth, no callus, no erythema, no maceration, no abnormal color, no pre-ulcer, no ulcer and no callus  Toe Exam: ROM and strength within normal limits  Sensory   Monofilament testing: intact    Left Foot/Ankle  Left Foot Inspection  Skin Exam: skin normal and skin intact  No dry skin, no warmth, no erythema, no maceration, normal color, no pre-ulcer, no ulcer and no callus  Toe Exam: ROM and strength within normal limits       Sensory   Monofilament testing: intact    Answers for HPI/ROS submitted by the patient on 5/23/2022  Diabetes type: type 2  MedicAlert ID: No  blurred vision: No  chest pain: No  fatigue: No  foot paresthesias: No  foot ulcerations: No  polydipsia: No  polyphagia: No  polyuria: No  visual change: No  weakness: No  weight loss: No  Symptom course: stable  confusion: No  dizziness: No  headaches: No  hunger: No  mood changes: No  nervous/anxious: No  pallor: No  seizures: No  sleepiness: No  speech difficulty: No  sweats: No  tremors: No  blackouts: No  hospitalization: No  nocturnal hypoglycemia: No  required assistance: No  required glucagon: No  CVA: No  heart disease: No  impotence: Yes  nephropathy: No  peripheral neuropathy: No  PVD: No  retinopathy: No  CAD risks: dyslipidemia, hypertension, obesity  Current treatments: oral agent (dual therapy)  Treatment compliance: all of the time  Home blood tests: 1-2 x per day  Home urines: <1 x per month  Monitoring compliance: fair  Blood glucose trend: no change  Weight trend: stable  Current diet: generally healthy  Meal planning: avoidance of concentrated sweets  Exercise: intermittently  Dietitian visit: No  Eye exam current: Yes

## 2022-05-23 NOTE — ASSESSMENT & PLAN NOTE
Lab Results   Component Value Date    HGBA1C 6 0 (H) 05/14/2022   Well controlled  Continue same  Will continue to monitor  Discussed about low carb diet  Come back in 6 months for follow-up

## 2022-05-23 NOTE — TELEPHONE ENCOUNTER
----- Message from Dar Fraire sent at 5/21/2022 11:44 AM EDT -----  Regarding: Blood sugar data   The attached pdf is my blood sugar data for the last three months

## 2022-05-23 NOTE — PROGRESS NOTES
Assessment/Plan:  Type 2 diabetes mellitus without complication, without long-term current use of insulin ([facility])    Lab Results   Component Value Date    HGBA1C 6 0 (H) 05/14/2022   Well controlled  Continue same  Will continue to monitor  Discussed about low carb diet  Come back in 6 months for follow-up  Asthma  Stable  Continue same  Will continue to monitor  Essential hypertension  Well controlled  Continue same  Will continue to monitor  Hyperlipidemia  Stable  Continue same  Continue to follow low-fat diet and regular exercise  Will continue to monitor fasting lipid profile  Diagnoses and all orders for this visit:    Type 2 diabetes mellitus without complication, without long-term current use of insulin ([facility])  -     CBC and differential; Future  -     Comprehensive metabolic panel; Future  -     Hemoglobin A1C; Future  -     Lipid Panel with Direct LDL reflex; Future  -     TSH, 3rd generation with Free T4 reflex; Future  -     Microalbumin / creatinine urine ratio    Mild intermittent asthma without complication    Mixed hyperlipidemia    Essential hypertension    BMI 29 0-29 9,adult        There are no Patient Instructions on file for this visit  Return in about 25 weeks (around 11/14/2022)  Subjective:      Patient ID: Kellie Obregon is a 55 y o  male  Chief Complaint   Patient presents with    Hypertension    Hyperlipidemia    Diabetes       He is here today follow-up multiple medical problems  He has been taking his medications  Denies any side effects from his medications  He had blood work done recently showed A1c well controlled  His cholesterol has been stable  He denies any complaint today  His blood sugar measurements at home have been well controlled  He continues on Ozempic and he has been losing weight  Diabetes  He has type 2 diabetes mellitus  No MedicAlert identification noted   Pertinent negatives for hypoglycemia include no confusion, dizziness, headaches, hunger, mood changes, nervousness/anxiousness, pallor, seizures, sleepiness, speech difficulty, sweats or tremors  Pertinent negatives for diabetes include no blurred vision, no chest pain, no fatigue, no foot paresthesias, no foot ulcerations, no polydipsia, no polyphagia, no polyuria, no visual change, no weakness and no weight loss  Pertinent negatives for hypoglycemia complications include no blackouts, no hospitalization, no nocturnal hypoglycemia, no required assistance and no required glucagon injection  Symptoms are stable  Diabetic complications include impotence  Pertinent negatives for diabetic complications include no CVA, heart disease, nephropathy, peripheral neuropathy, PVD or retinopathy  Risk factors for coronary artery disease include dyslipidemia, hypertension and obesity  Current diabetic treatment includes oral agent (dual therapy)  He is compliant with treatment all of the time  His weight is stable  He is following a generally healthy diet  Meal planning includes avoidance of concentrated sweets  He has not had a previous visit with a dietitian  He participates in exercise intermittently  He monitors blood glucose at home 1-2 x per day  He monitors urine at home <1 x per month  Blood glucose monitoring compliance is fair  There is no change in his home blood glucose trend  Eye exam is current  The following portions of the patient's history were reviewed and updated as appropriate: allergies, current medications, past family history, past medical history, past social history, past surgical history and problem list     Review of Systems   Constitutional: Negative for fatigue and weight loss  Eyes: Negative for blurred vision  Cardiovascular: Negative for chest pain  Endocrine: Negative for polydipsia, polyphagia and polyuria  Genitourinary: Positive for impotence  Skin: Negative for pallor     Neurological: Negative for dizziness, tremors, seizures, speech difficulty, weakness and headaches  Psychiatric/Behavioral: Negative for confusion  The patient is not nervous/anxious  Current Outpatient Medications   Medication Sig Dispense Refill    Accu-Chek Guide test strip PT TESTS BLOOD SUGAR TWICE DAILY 50 strip 5    albuterol (VENTOLIN HFA) 90 mcg/act inhaler Inhale 2 puffs every 6 (six) hours as needed for wheezing 18 g 1    aspirin (ECOTRIN LOW STRENGTH) 81 mg EC tablet Take 81 mg by mouth daily      Blood Glucose Monitoring Suppl (FreeStyle Lite) JCARLOS USE 1 EACH 2 TIMES A DAY 1 each 0    Blood Glucose Monitoring Suppl w/Device KIT Use 2 (two) times a day 1 kit 0    Cholecalciferol (VITAMIN D3) 2000 units capsule Take 2,000 Units by mouth daily      Lancets (freestyle) lancets Pt tests twice daily 100 each 1    losartan (COZAAR) 25 mg tablet TAKE 1 TABLET BY MOUTH EVERY DAY 30 tablet 0    metFORMIN (GLUCOPHAGE-XR) 500 mg 24 hr tablet TAKE 2 TABLETS BY MOUTH EVERY DAY 60 tablet 1    Ozempic, 1 MG/DOSE, 4 MG/3ML SOPN injection pen INJECT 0 75 ML (1 MG TOTAL) UNDER THE SKIN ONCE A WEEK 3 mL 3    rosuvastatin (CRESTOR) 10 MG tablet TAKE 1 TABLET BY MOUTH EVERY DAY 30 tablet 5    azelastine (ASTELIN) 0 1 % nasal spray  (Patient not taking: Reported on 11/5/2021)      triamcinolone (KENALOG) 0 5 % cream Apply topically 3 (three) times a day (Patient not taking: Reported on 11/5/2021) 30 g 1    valACYclovir (VALTREX) 1,000 mg tablet Take 1 tablet (1,000 mg total) by mouth 3 (three) times a day for 7 days 21 tablet 0     No current facility-administered medications for this visit  Objective:    /76 (BP Location: Left arm, Patient Position: Sitting, Cuff Size: Large)   Pulse 91   Temp 98 5 °F (36 9 °C) (Tympanic)   Resp 16   Ht 5' 11" (1 803 m)   Wt 96 3 kg (212 lb 6 4 oz)   SpO2 99%   BMI 29 62 kg/m²        Physical Exam  Vitals and nursing note reviewed  Constitutional:       Appearance: Normal appearance  He is well-developed  HENT:      Head: Normocephalic and atraumatic  Eyes:      Pupils: Pupils are equal, round, and reactive to light  Cardiovascular:      Rate and Rhythm: Normal rate and regular rhythm  Heart sounds: Normal heart sounds  Pulmonary:      Effort: Pulmonary effort is normal       Breath sounds: Normal breath sounds  Abdominal:      General: Bowel sounds are normal       Palpations: Abdomen is soft  Musculoskeletal:         General: Normal range of motion  Cervical back: Normal range of motion and neck supple  Lymphadenopathy:      Cervical: No cervical adenopathy  Skin:     General: Skin is warm  Findings: No rash  Neurological:      Mental Status: He is alert and oriented to person, place, and time  Cranial Nerves: No cranial nerve deficit  Shanell Thompson MD BMI Counseling: Body mass index is 29 62 kg/m²  The BMI is above normal  Nutrition recommendations include reducing portion sizes, decreasing overall calorie intake and 3-5 servings of fruits/vegetables daily  Exercise recommendations include moderate aerobic physical activity for 150 minutes/week

## 2022-05-23 NOTE — ASSESSMENT & PLAN NOTE
Stable  Continue same  Continue to follow low-fat diet and regular exercise  Will continue to monitor fasting lipid profile

## 2022-05-24 DIAGNOSIS — E78.2 MIXED HYPERLIPIDEMIA: ICD-10-CM

## 2022-05-24 RX ORDER — ROSUVASTATIN CALCIUM 10 MG/1
TABLET, COATED ORAL
Qty: 30 TABLET | Refills: 5 | Status: SHIPPED | OUTPATIENT
Start: 2022-05-24

## 2022-06-05 DIAGNOSIS — I10 ESSENTIAL HYPERTENSION: ICD-10-CM

## 2022-06-05 DIAGNOSIS — E11.9 TYPE 2 DIABETES MELLITUS WITHOUT COMPLICATION, WITHOUT LONG-TERM CURRENT USE OF INSULIN (HCC): ICD-10-CM

## 2022-06-06 RX ORDER — LOSARTAN POTASSIUM 25 MG/1
TABLET ORAL
Qty: 90 TABLET | Refills: 1 | Status: SHIPPED | OUTPATIENT
Start: 2022-06-06 | End: 2022-08-03

## 2022-06-06 RX ORDER — METFORMIN HYDROCHLORIDE 500 MG/1
TABLET, EXTENDED RELEASE ORAL
Qty: 180 TABLET | Refills: 1 | Status: SHIPPED | OUTPATIENT
Start: 2022-06-06 | End: 2022-08-03

## 2022-06-06 NOTE — TELEPHONE ENCOUNTER
Pharmacy requesting refill on losartan and metformin  Last seen 5/23/22  Has appt 11/28/22  Medications sent to pharmacy

## 2022-06-18 DIAGNOSIS — E11.9 TYPE 2 DIABETES MELLITUS WITHOUT COMPLICATION, WITHOUT LONG-TERM CURRENT USE OF INSULIN (HCC): ICD-10-CM

## 2022-06-21 DIAGNOSIS — E11.9 TYPE 2 DIABETES MELLITUS WITHOUT COMPLICATION, WITHOUT LONG-TERM CURRENT USE OF INSULIN (HCC): ICD-10-CM

## 2022-06-21 RX ORDER — SEMAGLUTIDE 1.34 MG/ML
1 INJECTION, SOLUTION SUBCUTANEOUS WEEKLY
Qty: 3 ML | Refills: 3 | Status: SHIPPED | OUTPATIENT
Start: 2022-06-21

## 2022-06-21 RX ORDER — SEMAGLUTIDE 1.34 MG/ML
1 INJECTION, SOLUTION SUBCUTANEOUS WEEKLY
Qty: 3 ML | Refills: 0 | Status: CANCELLED | OUTPATIENT
Start: 2022-06-21

## 2022-08-03 DIAGNOSIS — E11.9 TYPE 2 DIABETES MELLITUS WITHOUT COMPLICATION, WITHOUT LONG-TERM CURRENT USE OF INSULIN (HCC): ICD-10-CM

## 2022-08-03 DIAGNOSIS — I10 ESSENTIAL HYPERTENSION: ICD-10-CM

## 2022-08-03 RX ORDER — METFORMIN HYDROCHLORIDE 500 MG/1
TABLET, EXTENDED RELEASE ORAL
Qty: 180 TABLET | Refills: 0 | Status: SHIPPED | OUTPATIENT
Start: 2022-08-03

## 2022-08-03 RX ORDER — LOSARTAN POTASSIUM 25 MG/1
TABLET ORAL
Qty: 90 TABLET | Refills: 0 | Status: SHIPPED | OUTPATIENT
Start: 2022-08-03

## 2022-08-12 DIAGNOSIS — E11.9 TYPE 2 DIABETES MELLITUS WITHOUT COMPLICATION, WITHOUT LONG-TERM CURRENT USE OF INSULIN (HCC): ICD-10-CM

## 2022-08-12 DIAGNOSIS — E78.2 MIXED HYPERLIPIDEMIA: ICD-10-CM

## 2022-08-12 RX ORDER — BLOOD SUGAR DIAGNOSTIC
STRIP MISCELLANEOUS
Qty: 50 STRIP | Refills: 5 | Status: SHIPPED | OUTPATIENT
Start: 2022-08-12

## 2022-08-12 RX ORDER — ROSUVASTATIN CALCIUM 10 MG/1
TABLET, COATED ORAL
Qty: 90 TABLET | Refills: 0 | Status: SHIPPED | OUTPATIENT
Start: 2022-08-12

## 2022-09-09 ENCOUNTER — TELEPHONE (OUTPATIENT)
Dept: FAMILY MEDICINE CLINIC | Facility: CLINIC | Age: 47
End: 2022-09-09

## 2022-09-09 NOTE — TELEPHONE ENCOUNTER
I spoke with pt and gave Dr Arianna Quintero recommendations; increase oral hydration, otc nasal spray and mucinex  As needed for congestion, Vaporizer  And tylenol/ ibuprofen prn fever or body aches  Pt agrees and will call office if needed for increase in symptoms

## 2022-09-09 NOTE — TELEPHONE ENCOUNTER
Patient called and said he has tested positive for covid today with home test  (wife and son also positive)    Is asking if there is something that the doctor usually orders? He said his symptoms are mild, fatigue, body aches and sinus pain  Asking for a call    Did discuss quarantine

## 2022-10-12 PROBLEM — Z00.00 HEALTHCARE MAINTENANCE: Status: RESOLVED | Noted: 2020-11-30 | Resolved: 2022-10-12

## 2022-10-16 DIAGNOSIS — E11.9 TYPE 2 DIABETES MELLITUS WITHOUT COMPLICATION, WITHOUT LONG-TERM CURRENT USE OF INSULIN (HCC): ICD-10-CM

## 2022-10-17 RX ORDER — SEMAGLUTIDE 1.34 MG/ML
1 INJECTION, SOLUTION SUBCUTANEOUS WEEKLY
Qty: 3 ML | Refills: 3 | Status: SHIPPED | OUTPATIENT
Start: 2022-10-17

## 2022-10-17 NOTE — TELEPHONE ENCOUNTER
Pharmacy requesting refill on Ozempic  Last seen 5/23/22  Has appt 11/28/22  Medication sent to pharmacy

## 2022-11-05 DIAGNOSIS — E11.9 TYPE 2 DIABETES MELLITUS WITHOUT COMPLICATION, WITHOUT LONG-TERM CURRENT USE OF INSULIN (HCC): ICD-10-CM

## 2022-11-05 DIAGNOSIS — I10 ESSENTIAL HYPERTENSION: ICD-10-CM

## 2022-11-07 DIAGNOSIS — E78.2 MIXED HYPERLIPIDEMIA: ICD-10-CM

## 2022-11-07 RX ORDER — METFORMIN HYDROCHLORIDE 500 MG/1
TABLET, EXTENDED RELEASE ORAL
Qty: 180 TABLET | Refills: 1 | Status: SHIPPED | OUTPATIENT
Start: 2022-11-07

## 2022-11-07 RX ORDER — ROSUVASTATIN CALCIUM 10 MG/1
TABLET, COATED ORAL
Qty: 90 TABLET | Refills: 1 | Status: SHIPPED | OUTPATIENT
Start: 2022-11-07

## 2022-11-07 RX ORDER — LOSARTAN POTASSIUM 25 MG/1
TABLET ORAL
Qty: 90 TABLET | Refills: 1 | Status: SHIPPED | OUTPATIENT
Start: 2022-11-07

## 2022-11-07 NOTE — TELEPHONE ENCOUNTER
Pharmacy requesting refill on Crestor   Last seen 5/23/22  Has appt 11/28/22  Medication sent to pharmacy

## 2022-11-07 NOTE — TELEPHONE ENCOUNTER
Pharmacy requesting refill on losartan and metformin  Last seen 5/23/22  Has appt 11/28/22  Medication sent to pharmacy

## 2022-12-03 ENCOUNTER — APPOINTMENT (OUTPATIENT)
Dept: LAB | Facility: IMAGING CENTER | Age: 47
End: 2022-12-03

## 2022-12-03 DIAGNOSIS — E11.9 TYPE 2 DIABETES MELLITUS WITHOUT COMPLICATION, WITHOUT LONG-TERM CURRENT USE OF INSULIN (HCC): ICD-10-CM

## 2022-12-03 LAB
ALBUMIN SERPL BCP-MCNC: 4.2 G/DL (ref 3.5–5)
ALP SERPL-CCNC: 69 U/L (ref 46–116)
ALT SERPL W P-5'-P-CCNC: 47 U/L (ref 12–78)
ANION GAP SERPL CALCULATED.3IONS-SCNC: 6 MMOL/L (ref 4–13)
AST SERPL W P-5'-P-CCNC: 21 U/L (ref 5–45)
BASOPHILS # BLD AUTO: 0.05 THOUSANDS/ÂΜL (ref 0–0.1)
BASOPHILS NFR BLD AUTO: 1 % (ref 0–1)
BILIRUB SERPL-MCNC: 0.6 MG/DL (ref 0.2–1)
BUN SERPL-MCNC: 12 MG/DL (ref 5–25)
CALCIUM SERPL-MCNC: 9.4 MG/DL (ref 8.3–10.1)
CHLORIDE SERPL-SCNC: 106 MMOL/L (ref 96–108)
CHOLEST SERPL-MCNC: 138 MG/DL
CO2 SERPL-SCNC: 27 MMOL/L (ref 21–32)
CREAT SERPL-MCNC: 0.93 MG/DL (ref 0.6–1.3)
CREAT UR-MCNC: 349 MG/DL
EOSINOPHIL # BLD AUTO: 0.18 THOUSAND/ÂΜL (ref 0–0.61)
EOSINOPHIL NFR BLD AUTO: 3 % (ref 0–6)
ERYTHROCYTE [DISTWIDTH] IN BLOOD BY AUTOMATED COUNT: 13.2 % (ref 11.6–15.1)
EST. AVERAGE GLUCOSE BLD GHB EST-MCNC: 123 MG/DL
GFR SERPL CREATININE-BSD FRML MDRD: 97 ML/MIN/1.73SQ M
GLUCOSE P FAST SERPL-MCNC: 110 MG/DL (ref 65–99)
HBA1C MFR BLD: 5.9 %
HCT VFR BLD AUTO: 47.1 % (ref 36.5–49.3)
HDLC SERPL-MCNC: 46 MG/DL
HGB BLD-MCNC: 15.5 G/DL (ref 12–17)
IMM GRANULOCYTES # BLD AUTO: 0.02 THOUSAND/UL (ref 0–0.2)
IMM GRANULOCYTES NFR BLD AUTO: 0 % (ref 0–2)
LDLC SERPL CALC-MCNC: 67 MG/DL (ref 0–100)
LYMPHOCYTES # BLD AUTO: 1.89 THOUSANDS/ÂΜL (ref 0.6–4.47)
LYMPHOCYTES NFR BLD AUTO: 29 % (ref 14–44)
MCH RBC QN AUTO: 29.9 PG (ref 26.8–34.3)
MCHC RBC AUTO-ENTMCNC: 32.9 G/DL (ref 31.4–37.4)
MCV RBC AUTO: 91 FL (ref 82–98)
MICROALBUMIN UR-MCNC: 18.8 MG/L (ref 0–20)
MICROALBUMIN/CREAT 24H UR: 5 MG/G CREATININE (ref 0–30)
MONOCYTES # BLD AUTO: 0.57 THOUSAND/ÂΜL (ref 0.17–1.22)
MONOCYTES NFR BLD AUTO: 9 % (ref 4–12)
NEUTROPHILS # BLD AUTO: 3.9 THOUSANDS/ÂΜL (ref 1.85–7.62)
NEUTS SEG NFR BLD AUTO: 58 % (ref 43–75)
NRBC BLD AUTO-RTO: 0 /100 WBCS
PLATELET # BLD AUTO: 221 THOUSANDS/UL (ref 149–390)
PMV BLD AUTO: 10.5 FL (ref 8.9–12.7)
POTASSIUM SERPL-SCNC: 3.7 MMOL/L (ref 3.5–5.3)
PROT SERPL-MCNC: 7.2 G/DL (ref 6.4–8.4)
RBC # BLD AUTO: 5.18 MILLION/UL (ref 3.88–5.62)
SODIUM SERPL-SCNC: 139 MMOL/L (ref 135–147)
TRIGL SERPL-MCNC: 126 MG/DL
TSH SERPL DL<=0.05 MIU/L-ACNC: 1.63 UIU/ML (ref 0.45–4.5)
WBC # BLD AUTO: 6.61 THOUSAND/UL (ref 4.31–10.16)

## 2022-12-12 ENCOUNTER — OFFICE VISIT (OUTPATIENT)
Dept: FAMILY MEDICINE CLINIC | Facility: CLINIC | Age: 47
End: 2022-12-12

## 2022-12-12 VITALS
HEART RATE: 88 BPM | WEIGHT: 211 LBS | DIASTOLIC BLOOD PRESSURE: 88 MMHG | OXYGEN SATURATION: 98 % | HEIGHT: 71 IN | SYSTOLIC BLOOD PRESSURE: 126 MMHG | RESPIRATION RATE: 16 BRPM | BODY MASS INDEX: 29.54 KG/M2 | TEMPERATURE: 97.8 F

## 2022-12-12 DIAGNOSIS — E78.2 MIXED HYPERLIPIDEMIA: ICD-10-CM

## 2022-12-12 DIAGNOSIS — J45.20 MILD INTERMITTENT ASTHMA WITHOUT COMPLICATION: ICD-10-CM

## 2022-12-12 DIAGNOSIS — I10 ESSENTIAL HYPERTENSION: ICD-10-CM

## 2022-12-12 DIAGNOSIS — E11.9 TYPE 2 DIABETES MELLITUS WITHOUT COMPLICATION, WITHOUT LONG-TERM CURRENT USE OF INSULIN (HCC): Primary | ICD-10-CM

## 2022-12-12 DIAGNOSIS — Z12.5 PROSTATE CANCER SCREENING: ICD-10-CM

## 2022-12-12 DIAGNOSIS — Z00.01 ABNORMAL WELLNESS EXAM: ICD-10-CM

## 2022-12-12 RX ORDER — LOSARTAN POTASSIUM 25 MG/1
25 TABLET ORAL DAILY
Qty: 90 TABLET | Refills: 1 | Status: SHIPPED | OUTPATIENT
Start: 2022-12-12

## 2022-12-12 RX ORDER — SEMAGLUTIDE 1.34 MG/ML
1 INJECTION, SOLUTION SUBCUTANEOUS WEEKLY
Qty: 3 ML | Refills: 3 | Status: SHIPPED | OUTPATIENT
Start: 2022-12-12

## 2022-12-12 NOTE — ASSESSMENT & PLAN NOTE
Blood pressure is well controlled at 126/88  Continue on losartan 25 mg daily  We will continue to monitor

## 2022-12-12 NOTE — ASSESSMENT & PLAN NOTE
Component      Latest Ref Rng & Units 10/29/2021 5/14/2022 12/3/2022   Cholesterol      See Comment mg/dL 138 114 138   HDL      >=40 mg/dL 45 33 (L) 46   LDL Calculated      0 - 100 mg/dL 67 58 67   Triglycerides      See Comment mg/dL 130 116 126   Well-controlled  Continue on Crestor 10 mg 1 daily  We will continue to monitor

## 2022-12-12 NOTE — ASSESSMENT & PLAN NOTE
Well-controlled  Continue on metformin and Ozempic  We will continue to monitor  Repeat blood work before next appointment in 6 months    Lab Results   Component Value Date    HGBA1C 5 9 (H) 12/03/2022

## 2022-12-12 NOTE — PROGRESS NOTES
Name: Iván Jung      : 1975      MRN: [id number]  Encounter Provider: Mario Nice MD  Encounter Date: 2022   Encounter department: [address] [place] [address]  Type 2 diabetes mellitus without complication, without long-term current use of insulin ([facility])  Assessment & Plan:  Well-controlled  Continue on metformin and Ozempic  We will continue to monitor  Repeat blood work before next appointment in 6 months  Lab Results   Component Value Date    HGBA1C 5 9 (H) 2022       Orders:  -     semaglutide, 1 mg/dose, (Ozempic, 1 MG/DOSE,) 4 MG/3ML SOPN injection pen; Inject 0 75 mL (1 mg total) under the skin once a week  -     CBC and differential; Future  -     Comprehensive metabolic panel; Future  -     Hemoglobin A1C; Future  -     Lipid Panel with Direct LDL reflex; Future  -     TSH, 3rd generation with Free T4 reflex; Future  -     Microalbumin / creatinine urine ratio    2  Prostate cancer screening  -     PSA, Total Screen; Future    3  Essential hypertension  Assessment & Plan:  Blood pressure is well controlled at 126/88  Continue on losartan 25 mg daily  We will continue to monitor  Orders:  -     losartan (COZAAR) 25 mg tablet; Take 1 tablet (25 mg total) by mouth daily  -     CBC and differential; Future  -     Comprehensive metabolic panel; Future  -     Lipid Panel with Direct LDL reflex; Future    4  Mixed hyperlipidemia  Assessment & Plan:  Component      Latest Ref Rng & Units 10/29/2021 2022 12/3/2022   Cholesterol      See Comment mg/dL 138 114 138   HDL      >=40 mg/dL 45 33 (L) 46   LDL Calculated      0 - 100 mg/dL 67 58 67   Triglycerides      See Comment mg/dL 130 116 126   Well-controlled  Continue on Crestor 10 mg 1 daily  We will continue to monitor  5  Mild intermittent asthma without complication  Assessment & Plan:  Stable  Continue albuterol as needed  We will continue to monitor        6  Abnormal wellness exam  Assessment & Plan: It was discussed with immunizations, diet, exercise and safety measures  7  BMI 29 0-29 9,adult        Depression Screening and Follow-up Plan: Patient was screened for depression during today's encounter  They screened negative with a PHQ-2 score of 0  Subjective     Is here today for follow-up multiple medical problems  He has been taking his medications  He denies any side effects on his medications  Blood work came back showing A1c well controlled  All the rest of blood work came back stable  He denies any complaint today  Hypertension  Pertinent negatives include no chest pain, headaches, palpitations or shortness of breath  Diabetes  Pertinent negatives for hypoglycemia include no dizziness or headaches  Pertinent negatives for diabetes include no chest pain  Hyperlipidemia  Pertinent negatives include no chest pain or shortness of breath  Review of Systems   Constitutional: Negative for chills and fever  HENT: Negative for trouble swallowing  Eyes: Negative for visual disturbance  Respiratory: Negative for cough and shortness of breath  Cardiovascular: Negative for chest pain and palpitations  Gastrointestinal: Negative for abdominal pain, blood in stool and vomiting  Endocrine: Negative for cold intolerance and heat intolerance  Genitourinary: Negative for difficulty urinating and dysuria  Musculoskeletal: Negative for gait problem  Skin: Negative for rash  Neurological: Negative for dizziness, syncope and headaches  Hematological: Negative for adenopathy  Psychiatric/Behavioral: Negative for behavioral problems  Past Medical History:   Diagnosis Date   • Asthma    • DM (diabetes mellitus) ([facility] [address] )    • Hyperlipidemia    • Obesity      History reviewed  No pertinent surgical history  History reviewed  No pertinent family history    Social History     Socioeconomic History   • Marital status: /Civil Union Spouse name: None   • Number of children: None   • Years of education: None   • Highest education level: None   Occupational History   • None   Tobacco Use   • Smoking status: Never   • Smokeless tobacco: Never   • Tobacco comments:     no passive smoke exposure   Vaping Use   • Vaping Use: Never used   Substance and Sexual Activity   • Alcohol use: Yes     Comment: socially   • Drug use: No   • Sexual activity: None   Other Topics Concern   • None   Social History Narrative   • None     Social Determinants of Health     Financial Resource Strain: Not on file   Food Insecurity: Not on file   Transportation Needs: Not on file   Physical Activity: Not on file   Stress: Not on file   Social Connections: Not on file   Intimate Partner Violence: Not on file   Housing Stability: Not on file     Current Outpatient Medications on File Prior to Visit   Medication Sig   • Accu-Chek Guide test strip TEST BLOOD SUGAR TWICE DAILY   • albuterol (VENTOLIN HFA) 90 mcg/act inhaler Inhale 2 puffs every 6 (six) hours as needed for wheezing   • aspirin (ECOTRIN LOW STRENGTH) 81 mg EC tablet Take 81 mg by mouth daily   • Blood Glucose Monitoring Suppl (FreeStyle Lite) JCARLOS USE 1 EACH 2 TIMES A DAY   • Blood Glucose Monitoring Suppl w/Device KIT Use 2 (two) times a day   • Cholecalciferol (VITAMIN D3) 2000 units capsule Take 2,000 Units by mouth daily   • Lancets (freestyle) lancets Pt tests twice daily   • metFORMIN (GLUCOPHAGE-XR) 500 mg 24 hr tablet TAKE 2 TABLETS BY MOUTH EVERY DAY   • rosuvastatin (CRESTOR) 10 MG tablet TAKE 1 TABLET BY MOUTH EVERY DAY   • [DISCONTINUED] losartan (COZAAR) 25 mg tablet TAKE 1 TABLET BY MOUTH EVERY DAY   • [DISCONTINUED] Ozempic, 1 MG/DOSE, 4 MG/3ML SOPN injection pen INJECT 0 75 ML (1 MG TOTAL) UNDER THE SKIN ONCE A WEEK   • azelastine (ASTELIN) 0 1 % nasal spray  (Patient not taking: Reported on 11/5/2021)   • triamcinolone (KENALOG) 0 5 % cream Apply topically 3 (three) times a day (Patient not taking: Reported on 11/5/2021)   • valACYclovir (VALTREX) 1,000 mg tablet Take 1 tablet (1,000 mg total) by mouth 3 (three) times a day for 7 days     Allergies   Allergen Reactions   • Amoxicillin      Immunization History   Administered Date(s) Administered   • COVID-19 MODERNA VACC 0 5 ML IM 02/13/2021, 02/13/2021, 03/20/2021, 03/20/2021   • INFLUENZA 11/04/2014, 11/03/2016, 09/29/2018, 10/14/2021, 11/16/2022   • Influenza, injectable, quadrivalent, preservative free 0 5 mL 09/29/2018, 11/02/2019   • Influenza, recombinant, quadrivalent,injectable, preservative free 10/19/2020   • Pneumococcal Polysaccharide PPV23 03/29/2018   • Tdap 11/04/2014   • Zoster Vaccine Recombinant 06/07/2021, 08/25/2021       Objective     /88 (BP Location: Left arm, Patient Position: Sitting, Cuff Size: Large)   Pulse 88   Temp 97 8 °F (36 6 °C) (Tympanic)   Resp 16   Ht 5' 11" (1 803 m)   Wt 95 7 kg (211 lb)   SpO2 98%   BMI 29 43 kg/m²     Physical Exam  Vitals and nursing note reviewed  Constitutional:       Appearance: He is well-developed  HENT:      Head: Normocephalic and atraumatic  Eyes:      Pupils: Pupils are equal, round, and reactive to light  Cardiovascular:      Rate and Rhythm: Normal rate and regular rhythm  Heart sounds: Normal heart sounds  Pulmonary:      Effort: Pulmonary effort is normal       Breath sounds: Normal breath sounds  Abdominal:      General: Bowel sounds are normal       Palpations: Abdomen is soft  Musculoskeletal:         General: Normal range of motion  Cervical back: Normal range of motion and neck supple  Lymphadenopathy:      Cervical: No cervical adenopathy  Skin:     General: Skin is warm  Findings: No rash  Neurological:      Mental Status: He is alert and oriented to person, place, and time  Cranial Nerves: No cranial nerve deficit  Kenneth Tamayo MD BMI Counseling: Body mass index is 29 43 kg/m²   The BMI is above normal  Nutrition recommendations include reducing portion sizes, decreasing overall calorie intake and 3-5 servings of fruits/vegetables daily  Exercise recommendations include moderate aerobic physical activity for 150 minutes/week

## 2022-12-12 NOTE — PROGRESS NOTES
Name: Janet Mata      : 1975      MRN: [id number]  Encounter Provider: Hoa Julian MD  Encounter Date: 2022   Encounter department: [address] [place] [address]  Type 2 diabetes mellitus without complication, without long-term current use of insulin ([facility])  Assessment & Plan:  Well-controlled  Continue on metformin and Ozempic  We will continue to monitor  Repeat blood work before next appointment in 6 months  Lab Results   Component Value Date    HGBA1C 5 9 (H) 2022       Orders:  -     semaglutide, 1 mg/dose, (Ozempic, 1 MG/DOSE,) 4 MG/3ML SOPN injection pen; Inject 0 75 mL (1 mg total) under the skin once a week  -     CBC and differential; Future  -     Comprehensive metabolic panel; Future  -     Hemoglobin A1C; Future  -     Lipid Panel with Direct LDL reflex; Future  -     TSH, 3rd generation with Free T4 reflex; Future  -     Microalbumin / creatinine urine ratio    2  Prostate cancer screening  -     PSA, Total Screen; Future    3  Essential hypertension  Assessment & Plan:  Blood pressure is well controlled at 126/88  Continue on losartan 25 mg daily  We will continue to monitor  Orders:  -     losartan (COZAAR) 25 mg tablet; Take 1 tablet (25 mg total) by mouth daily  -     CBC and differential; Future  -     Comprehensive metabolic panel; Future  -     Lipid Panel with Direct LDL reflex; Future    4  Mixed hyperlipidemia  Assessment & Plan:  Component      Latest Ref Rng & Units 10/29/2021 2022 12/3/2022   Cholesterol      See Comment mg/dL 138 114 138   HDL      >=40 mg/dL 45 33 (L) 46   LDL Calculated      0 - 100 mg/dL 67 58 67   Triglycerides      See Comment mg/dL 130 116 126   Well-controlled  Continue on Crestor 10 mg 1 daily  We will continue to monitor  5  Mild intermittent asthma without complication  Assessment & Plan:  Stable  Continue albuterol as needed  We will continue to monitor        6  Abnormal wellness exam  Assessment & Plan: It was discussed with immunizations, diet, exercise and safety measures  7  BMI 29 0-29 9,adult           Subjective     Is here today for follow-up multiple medical problems  He has been taking his medications  He denies any side effects on his medications  Blood work came back showing A1c well controlled  All the rest of blood work came back stable  He denies any complaint today  Review of Systems   Constitutional: Negative for chills and fever  HENT: Negative for trouble swallowing  Eyes: Negative for visual disturbance  Respiratory: Negative for cough and shortness of breath  Cardiovascular: Negative for chest pain and palpitations  Gastrointestinal: Negative for abdominal pain, blood in stool and vomiting  Endocrine: Negative for cold intolerance and heat intolerance  Genitourinary: Negative for difficulty urinating and dysuria  Musculoskeletal: Negative for gait problem  Skin: Negative for rash  Neurological: Negative for dizziness, syncope and headaches  Hematological: Negative for adenopathy  Psychiatric/Behavioral: Negative for behavioral problems  Past Medical History:   Diagnosis Date   • Asthma    • DM (diabetes mellitus) ([facility] [address] )    • Hyperlipidemia    • Obesity      History reviewed  No pertinent surgical history  History reviewed  No pertinent family history    Social History     Socioeconomic History   • Marital status: /Civil Union     Spouse name: None   • Number of children: None   • Years of education: None   • Highest education level: None   Occupational History   • None   Tobacco Use   • Smoking status: Never   • Smokeless tobacco: Never   • Tobacco comments:     no passive smoke exposure   Vaping Use   • Vaping Use: Never used   Substance and Sexual Activity   • Alcohol use: Yes     Comment: socially   • Drug use: No   • Sexual activity: None   Other Topics Concern   • None   Social History Narrative   • None Social Determinants of Health     Financial Resource Strain: Not on file   Food Insecurity: Not on file   Transportation Needs: Not on file   Physical Activity: Not on file   Stress: Not on file   Social Connections: Not on file   Intimate Partner Violence: Not on file   Housing Stability: Not on file     Current Outpatient Medications on File Prior to Visit   Medication Sig   • Accu-Chek Guide test strip TEST BLOOD SUGAR TWICE DAILY   • albuterol (VENTOLIN HFA) 90 mcg/act inhaler Inhale 2 puffs every 6 (six) hours as needed for wheezing   • aspirin (ECOTRIN LOW STRENGTH) 81 mg EC tablet Take 81 mg by mouth daily   • Blood Glucose Monitoring Suppl (FreeStyle Lite) JCARLOS USE 1 EACH 2 TIMES A DAY   • Blood Glucose Monitoring Suppl w/Device KIT Use 2 (two) times a day   • Cholecalciferol (VITAMIN D3) 2000 units capsule Take 2,000 Units by mouth daily   • Lancets (freestyle) lancets Pt tests twice daily   • metFORMIN (GLUCOPHAGE-XR) 500 mg 24 hr tablet TAKE 2 TABLETS BY MOUTH EVERY DAY   • rosuvastatin (CRESTOR) 10 MG tablet TAKE 1 TABLET BY MOUTH EVERY DAY   • [DISCONTINUED] losartan (COZAAR) 25 mg tablet TAKE 1 TABLET BY MOUTH EVERY DAY   • [DISCONTINUED] Ozempic, 1 MG/DOSE, 4 MG/3ML SOPN injection pen INJECT 0 75 ML (1 MG TOTAL) UNDER THE SKIN ONCE A WEEK   • azelastine (ASTELIN) 0 1 % nasal spray  (Patient not taking: Reported on 11/5/2021)   • triamcinolone (KENALOG) 0 5 % cream Apply topically 3 (three) times a day (Patient not taking: Reported on 11/5/2021)   • valACYclovir (VALTREX) 1,000 mg tablet Take 1 tablet (1,000 mg total) by mouth 3 (three) times a day for 7 days     Allergies   Allergen Reactions   • Amoxicillin      Immunization History   Administered Date(s) Administered   • COVID-19 MODERNA VACC 0 5 ML IM 02/13/2021, 02/13/2021, 03/20/2021, 03/20/2021   • INFLUENZA 11/04/2014, 11/03/2016, 09/29/2018, 10/14/2021, 11/16/2022   • Influenza, injectable, quadrivalent, preservative free 0 5 mL 09/29/2018, 11/02/2019   • Influenza, recombinant, quadrivalent,injectable, preservative free 10/19/2020   • Pneumococcal Polysaccharide PPV23 03/29/2018   • Tdap 11/04/2014   • Zoster Vaccine Recombinant 06/07/2021, 08/25/2021       Objective     /88 (BP Location: Left arm, Patient Position: Sitting, Cuff Size: Large)   Pulse 88   Temp 97 8 °F (36 6 °C) (Tympanic)   Resp 16   Ht 5' 11" (1 803 m)   Wt 95 7 kg (211 lb)   SpO2 98%   BMI 29 43 kg/m²     Physical Exam  Vitals and nursing note reviewed  Constitutional:       Appearance: He is well-developed  HENT:      Head: Normocephalic and atraumatic  Eyes:      Pupils: Pupils are equal, round, and reactive to light  Cardiovascular:      Rate and Rhythm: Normal rate and regular rhythm  Heart sounds: Normal heart sounds  Pulmonary:      Effort: Pulmonary effort is normal       Breath sounds: Normal breath sounds  Abdominal:      General: Bowel sounds are normal       Palpations: Abdomen is soft  Musculoskeletal:         General: Normal range of motion  Cervical back: Normal range of motion and neck supple  Lymphadenopathy:      Cervical: No cervical adenopathy  Skin:     General: Skin is warm  Findings: No rash  Neurological:      Mental Status: He is alert and oriented to person, place, and time  Cranial Nerves: No cranial nerve deficit  Edyta Calle MD BMI Counseling: Body mass index is 29 43 kg/m²  The BMI is above normal  Nutrition recommendations include reducing portion sizes, decreasing overall calorie intake and 3-5 servings of fruits/vegetables daily  Exercise recommendations include moderate aerobic physical activity for 150 minutes/week

## 2022-12-26 DIAGNOSIS — E11.9 TYPE 2 DIABETES MELLITUS WITHOUT COMPLICATION, WITHOUT LONG-TERM CURRENT USE OF INSULIN (HCC): ICD-10-CM

## 2022-12-27 RX ORDER — BLOOD SUGAR DIAGNOSTIC
STRIP MISCELLANEOUS
Qty: 200 STRIP | Refills: 1 | Status: SHIPPED | OUTPATIENT
Start: 2022-12-27

## 2023-06-07 ENCOUNTER — APPOINTMENT (OUTPATIENT)
Dept: LAB | Facility: IMAGING CENTER | Age: 48
End: 2023-06-07
Payer: COMMERCIAL

## 2023-06-07 DIAGNOSIS — I10 ESSENTIAL HYPERTENSION: ICD-10-CM

## 2023-06-07 DIAGNOSIS — E11.9 TYPE 2 DIABETES MELLITUS WITHOUT COMPLICATION, WITHOUT LONG-TERM CURRENT USE OF INSULIN (HCC): ICD-10-CM

## 2023-06-07 DIAGNOSIS — Z12.5 PROSTATE CANCER SCREENING: ICD-10-CM

## 2023-06-07 LAB
ALBUMIN SERPL BCP-MCNC: 3.9 G/DL (ref 3.5–5)
ALP SERPL-CCNC: 68 U/L (ref 46–116)
ALT SERPL W P-5'-P-CCNC: 32 U/L (ref 12–78)
ANION GAP SERPL CALCULATED.3IONS-SCNC: 2 MMOL/L (ref 4–13)
AST SERPL W P-5'-P-CCNC: 18 U/L (ref 5–45)
BASOPHILS # BLD AUTO: 0.04 THOUSANDS/ÂΜL (ref 0–0.1)
BASOPHILS NFR BLD AUTO: 1 % (ref 0–1)
BILIRUB SERPL-MCNC: 0.4 MG/DL (ref 0.2–1)
BUN SERPL-MCNC: 12 MG/DL (ref 5–25)
CALCIUM SERPL-MCNC: 9.4 MG/DL (ref 8.3–10.1)
CHLORIDE SERPL-SCNC: 109 MMOL/L (ref 96–108)
CHOLEST SERPL-MCNC: 142 MG/DL
CO2 SERPL-SCNC: 27 MMOL/L (ref 21–32)
CREAT SERPL-MCNC: 1.01 MG/DL (ref 0.6–1.3)
CREAT UR-MCNC: 195 MG/DL
EOSINOPHIL # BLD AUTO: 0.15 THOUSAND/ÂΜL (ref 0–0.61)
EOSINOPHIL NFR BLD AUTO: 2 % (ref 0–6)
ERYTHROCYTE [DISTWIDTH] IN BLOOD BY AUTOMATED COUNT: 13.2 % (ref 11.6–15.1)
EST. AVERAGE GLUCOSE BLD GHB EST-MCNC: 128 MG/DL
GFR SERPL CREATININE-BSD FRML MDRD: 88 ML/MIN/1.73SQ M
GLUCOSE P FAST SERPL-MCNC: 102 MG/DL (ref 65–99)
HBA1C MFR BLD: 6.1 %
HCT VFR BLD AUTO: 45.5 % (ref 36.5–49.3)
HDLC SERPL-MCNC: 44 MG/DL
HGB BLD-MCNC: 15.1 G/DL (ref 12–17)
IMM GRANULOCYTES # BLD AUTO: 0.01 THOUSAND/UL (ref 0–0.2)
IMM GRANULOCYTES NFR BLD AUTO: 0 % (ref 0–2)
LDLC SERPL CALC-MCNC: 78 MG/DL (ref 0–100)
LYMPHOCYTES # BLD AUTO: 1.62 THOUSANDS/ÂΜL (ref 0.6–4.47)
LYMPHOCYTES NFR BLD AUTO: 24 % (ref 14–44)
MCH RBC QN AUTO: 29.6 PG (ref 26.8–34.3)
MCHC RBC AUTO-ENTMCNC: 33.2 G/DL (ref 31.4–37.4)
MCV RBC AUTO: 89 FL (ref 82–98)
MICROALBUMIN UR-MCNC: 9.7 MG/L (ref 0–20)
MICROALBUMIN/CREAT 24H UR: 5 MG/G CREATININE (ref 0–30)
MONOCYTES # BLD AUTO: 0.58 THOUSAND/ÂΜL (ref 0.17–1.22)
MONOCYTES NFR BLD AUTO: 9 % (ref 4–12)
NEUTROPHILS # BLD AUTO: 4.36 THOUSANDS/ÂΜL (ref 1.85–7.62)
NEUTS SEG NFR BLD AUTO: 64 % (ref 43–75)
NRBC BLD AUTO-RTO: 0 /100 WBCS
PLATELET # BLD AUTO: 214 THOUSANDS/UL (ref 149–390)
PMV BLD AUTO: 10.6 FL (ref 8.9–12.7)
POTASSIUM SERPL-SCNC: 4.2 MMOL/L (ref 3.5–5.3)
PROT SERPL-MCNC: 7.5 G/DL (ref 6.4–8.4)
PSA SERPL-MCNC: 0.65 NG/ML (ref 0–4)
RBC # BLD AUTO: 5.1 MILLION/UL (ref 3.88–5.62)
SODIUM SERPL-SCNC: 138 MMOL/L (ref 135–147)
TRIGL SERPL-MCNC: 100 MG/DL
TSH SERPL DL<=0.05 MIU/L-ACNC: 1.39 UIU/ML (ref 0.45–4.5)
WBC # BLD AUTO: 6.76 THOUSAND/UL (ref 4.31–10.16)

## 2023-06-07 PROCEDURE — 36415 COLL VENOUS BLD VENIPUNCTURE: CPT

## 2023-06-07 PROCEDURE — 80061 LIPID PANEL: CPT

## 2023-06-07 PROCEDURE — G0103 PSA SCREENING: HCPCS

## 2023-06-07 PROCEDURE — 85025 COMPLETE CBC W/AUTO DIFF WBC: CPT

## 2023-06-07 PROCEDURE — 80053 COMPREHEN METABOLIC PANEL: CPT

## 2023-06-07 PROCEDURE — 83036 HEMOGLOBIN GLYCOSYLATED A1C: CPT

## 2023-06-07 PROCEDURE — 84443 ASSAY THYROID STIM HORMONE: CPT

## 2023-06-12 ENCOUNTER — OFFICE VISIT (OUTPATIENT)
Dept: FAMILY MEDICINE CLINIC | Facility: CLINIC | Age: 48
End: 2023-06-12
Payer: COMMERCIAL

## 2023-06-12 VITALS
HEIGHT: 71 IN | HEART RATE: 92 BPM | RESPIRATION RATE: 16 BRPM | TEMPERATURE: 99.1 F | OXYGEN SATURATION: 97 % | WEIGHT: 213 LBS | BODY MASS INDEX: 29.82 KG/M2 | SYSTOLIC BLOOD PRESSURE: 124 MMHG | DIASTOLIC BLOOD PRESSURE: 78 MMHG

## 2023-06-12 DIAGNOSIS — J45.20 MILD INTERMITTENT ASTHMA WITHOUT COMPLICATION: ICD-10-CM

## 2023-06-12 DIAGNOSIS — I10 ESSENTIAL HYPERTENSION: ICD-10-CM

## 2023-06-12 DIAGNOSIS — E11.9 TYPE 2 DIABETES MELLITUS WITHOUT COMPLICATION, WITHOUT LONG-TERM CURRENT USE OF INSULIN (HCC): Primary | ICD-10-CM

## 2023-06-12 DIAGNOSIS — E78.2 MIXED HYPERLIPIDEMIA: ICD-10-CM

## 2023-06-12 PROCEDURE — 99214 OFFICE O/P EST MOD 30 MIN: CPT | Performed by: FAMILY MEDICINE

## 2023-06-12 RX ORDER — LOSARTAN POTASSIUM 25 MG/1
25 TABLET ORAL DAILY
Qty: 90 TABLET | Refills: 1 | Status: SHIPPED | OUTPATIENT
Start: 2023-06-12

## 2023-06-16 NOTE — ASSESSMENT & PLAN NOTE
A1c well controlled at 6 1  Continue on Ozempic but I am going to increase to 2 mg weekly  Discussed about possible side effect  Discussed about low-carb diet and regular exercise  Continue to monitor  Come back in 3 months    Lab Results   Component Value Date    HGBA1C 6 1 (H) 06/07/2023

## 2023-06-16 NOTE — ASSESSMENT & PLAN NOTE
Component      Latest Ref Rng & Units 5/14/2022 12/3/2022 6/7/2023   Cholesterol      See Comment mg/dL 114 138 142   Triglycerides      See Comment mg/dL 116 126 100   HDL      >=40 mg/dL 33 (L) 46 44   LDL Calculated      0 - 100 mg/dL 58 67 78   Well-controlled on Crestor  Continue same  We will continue to monitor  hyperkalemia, aletha

## 2023-06-16 NOTE — PROGRESS NOTES
Name: Doris Corona      : 1975      MRN: [id number]  Encounter Provider: Alisha Rangel MD  Encounter Date: 2023   Encounter department: [address] [place] [address]  Type 2 diabetes mellitus without complication, without long-term current use of insulin ([facility])  Assessment & Plan:  A1c well controlled at 6 1  Continue on Ozempic but I am going to increase to 2 mg weekly  Discussed about possible side effect  Discussed about low-carb diet and regular exercise  Continue to monitor  Come back in 3 months  Lab Results   Component Value Date    HGBA1C 6 1 (H) 2023       Orders:  -     semaglutide, 2 mg/dose, (Ozempic) 8 mg/ mL injection pen; Inject 0 75 mL (2 mg total) under the skin every 7 days  -     Hemoglobin A1C; Future; Expected date: 2023  -     Comprehensive metabolic panel; Future; Expected date: 2023  -     CBC and differential; Future; Expected date: 2023  -     Lipid Panel with Direct LDL reflex; Future; Expected date: 2023  -     TSH, 3rd generation with Free T4 reflex; Future; Expected date: 2023    2  Essential hypertension  Assessment & Plan:  Well-controlled  Continue same  We will continue to monitor  Orders:  -     losartan (COZAAR) 25 mg tablet; Take 1 tablet (25 mg total) by mouth daily    3  Mixed hyperlipidemia  Assessment & Plan:  Component      Latest Ref Rng & Units 2022 12/3/2022 2023   Cholesterol      See Comment mg/dL 114 138 142   Triglycerides      See Comment mg/dL 116 126 100   HDL      >=40 mg/dL 33 (L) 46 44   LDL Calculated      0 - 100 mg/dL 58 67 78   Well-controlled on Crestor  Continue same  We will continue to monitor  4  Mild intermittent asthma without complication  Assessment & Plan:  Stable  Continue on albuterol as needed  We will continue to monitor        5  BMI 29 0-29 9,adult           Subjective     Is here today for follow-up multiple medical problems  He has been taking his medications  He denies any side effects on his medications  Review of Systems   Constitutional: Negative for chills and fever  HENT: Negative for trouble swallowing  Eyes: Negative for visual disturbance  Respiratory: Negative for cough and shortness of breath  Cardiovascular: Negative for chest pain and palpitations  Gastrointestinal: Negative for abdominal pain, blood in stool and vomiting  Endocrine: Negative for cold intolerance and heat intolerance  Genitourinary: Negative for difficulty urinating and dysuria  Musculoskeletal: Negative for gait problem  Skin: Negative for rash  Neurological: Negative for dizziness, syncope and headaches  Hematological: Negative for adenopathy  Psychiatric/Behavioral: Negative for behavioral problems  Past Medical History:   Diagnosis Date   • Asthma    • DM (diabetes mellitus) ([facility] [address] )    • Hyperlipidemia    • Obesity      History reviewed  No pertinent surgical history  History reviewed  No pertinent family history    Social History     Socioeconomic History   • Marital status: /Civil Union     Spouse name: None   • Number of children: None   • Years of education: None   • Highest education level: None   Occupational History   • None   Tobacco Use   • Smoking status: Never   • Smokeless tobacco: Never   • Tobacco comments:     no passive smoke exposure   Vaping Use   • Vaping Use: Never used   Substance and Sexual Activity   • Alcohol use: Yes     Comment: socially   • Drug use: No   • Sexual activity: None   Other Topics Concern   • None   Social History Narrative   • None     Social Determinants of Health     Financial Resource Strain: Not on file   Food Insecurity: Not on file   Transportation Needs: Not on file   Physical Activity: Not on file   Stress: Not on file   Social Connections: Not on file   Intimate Partner Violence: Not on file   Housing Stability: Not on file     Current Outpatient "Medications on File Prior to Visit   Medication Sig   • Accu-Chek Guide test strip TEST BLOOD SUGAR TWICE DAILY   • albuterol (VENTOLIN HFA) 90 mcg/act inhaler Inhale 2 puffs every 6 (six) hours as needed for wheezing   • aspirin (ECOTRIN LOW STRENGTH) 81 mg EC tablet Take 81 mg by mouth daily   • Blood Glucose Monitoring Suppl (FreeStyle Lite) JCARLOS USE 1 EACH 2 TIMES A DAY   • Blood Glucose Monitoring Suppl w/Device KIT Use 2 (two) times a day   • Cholecalciferol (VITAMIN D3) 2000 units capsule Take 2,000 Units by mouth daily   • Lancets (freestyle) lancets Pt tests twice daily   • metFORMIN (GLUCOPHAGE-XR) 500 mg 24 hr tablet TAKE 2 TABLETS BY MOUTH EVERY DAY   • rosuvastatin (CRESTOR) 10 MG tablet TAKE 1 TABLET BY MOUTH EVERY DAY   • azelastine (ASTELIN) 0 1 % nasal spray  (Patient not taking: Reported on 11/5/2021)   • triamcinolone (KENALOG) 0 5 % cream Apply topically 3 (three) times a day (Patient not taking: Reported on 11/5/2021)   • valACYclovir (VALTREX) 1,000 mg tablet Take 1 tablet (1,000 mg total) by mouth 3 (three) times a day for 7 days     Allergies   Allergen Reactions   • Amoxicillin      Immunization History   Administered Date(s) Administered   • COVID-19 MODERNA VACC 0 5 ML IM 02/13/2021, 02/13/2021, 03/20/2021, 03/20/2021   • INFLUENZA 11/04/2014, 11/03/2016, 09/29/2018, 10/14/2021, 11/16/2022   • Influenza, injectable, quadrivalent, preservative free 0 5 mL 09/29/2018, 11/02/2019   • Influenza, recombinant, quadrivalent,injectable, preservative free 10/19/2020   • Pneumococcal Polysaccharide PPV23 03/29/2018   • Tdap 11/04/2014   • Zoster Vaccine Recombinant 06/07/2021, 08/25/2021       Objective     /78 (BP Location: Left arm, Patient Position: Sitting, Cuff Size: Large)   Pulse 92   Temp 99 1 °F (37 3 °C) (Tympanic)   Resp 16   Ht 5' 11\" (1 803 m)   Wt 96 6 kg (213 lb)   SpO2 97%   BMI 29 71 kg/m²     Physical Exam  Vitals and nursing note reviewed     Constitutional:       " Appearance: He is well-developed  HENT:      Head: Normocephalic and atraumatic  Eyes:      Pupils: Pupils are equal, round, and reactive to light  Cardiovascular:      Rate and Rhythm: Normal rate and regular rhythm  Heart sounds: Normal heart sounds  Pulmonary:      Effort: Pulmonary effort is normal       Breath sounds: Normal breath sounds  Abdominal:      General: Bowel sounds are normal       Palpations: Abdomen is soft  Musculoskeletal:         General: Normal range of motion  Cervical back: Normal range of motion and neck supple  Lymphadenopathy:      Cervical: No cervical adenopathy  Skin:     General: Skin is warm  Findings: No rash  Neurological:      Mental Status: He is alert and oriented to person, place, and time  Cranial Nerves: No cranial nerve deficit  Chayito العلي MD BMI Counseling: Body mass index is 29 71 kg/m²  The BMI is above normal  Nutrition recommendations include reducing portion sizes, decreasing overall calorie intake and 3-5 servings of fruits/vegetables daily  Exercise recommendations include moderate aerobic physical activity for 150 minutes/week

## 2023-07-02 DIAGNOSIS — E11.9 TYPE 2 DIABETES MELLITUS WITHOUT COMPLICATION, WITHOUT LONG-TERM CURRENT USE OF INSULIN (HCC): ICD-10-CM

## 2023-07-03 RX ORDER — BLOOD SUGAR DIAGNOSTIC
STRIP MISCELLANEOUS
Qty: 200 STRIP | Refills: 1 | Status: SHIPPED | OUTPATIENT
Start: 2023-07-03

## 2023-07-25 DIAGNOSIS — E11.9 TYPE 2 DIABETES MELLITUS WITHOUT COMPLICATION, WITHOUT LONG-TERM CURRENT USE OF INSULIN (HCC): ICD-10-CM

## 2023-07-27 RX ORDER — SEMAGLUTIDE 2.68 MG/ML
INJECTION, SOLUTION SUBCUTANEOUS
Refills: 1 | OUTPATIENT
Start: 2023-07-27

## 2023-07-27 NOTE — TELEPHONE ENCOUNTER
Pt aware Ozempic 2mg on back order. He is going to call other pharmacies and inform us where he would like us to send the prescription.

## 2023-07-31 ENCOUNTER — TELEPHONE (OUTPATIENT)
Dept: FAMILY MEDICINE CLINIC | Facility: CLINIC | Age: 48
End: 2023-07-31

## 2023-07-31 DIAGNOSIS — E11.9 TYPE 2 DIABETES MELLITUS WITHOUT COMPLICATION, WITHOUT LONG-TERM CURRENT USE OF INSULIN (HCC): Primary | ICD-10-CM

## 2023-07-31 NOTE — TELEPHONE ENCOUNTER
Please advise on sending on Ozempic dose (Ozempic 2mg on back order.)  Should pt increase metformin?

## 2023-10-22 DIAGNOSIS — E11.9 TYPE 2 DIABETES MELLITUS WITHOUT COMPLICATION, WITHOUT LONG-TERM CURRENT USE OF INSULIN (HCC): ICD-10-CM

## 2023-10-24 RX ORDER — SEMAGLUTIDE 1.34 MG/ML
INJECTION, SOLUTION SUBCUTANEOUS
Refills: 1 | OUTPATIENT
Start: 2023-10-24

## 2023-10-24 NOTE — TELEPHONE ENCOUNTER
Marcellus for pt to call to other pharmacies for his medication that is on back order.  He is to call with a new pharmacy number

## 2023-10-30 DIAGNOSIS — E78.2 MIXED HYPERLIPIDEMIA: ICD-10-CM

## 2023-10-30 DIAGNOSIS — E11.9 TYPE 2 DIABETES MELLITUS WITHOUT COMPLICATION, WITHOUT LONG-TERM CURRENT USE OF INSULIN (HCC): ICD-10-CM

## 2023-10-30 RX ORDER — METFORMIN HYDROCHLORIDE 500 MG/1
TABLET, EXTENDED RELEASE ORAL
Qty: 180 TABLET | Refills: 1 | Status: SHIPPED | OUTPATIENT
Start: 2023-10-30

## 2023-10-30 RX ORDER — ROSUVASTATIN CALCIUM 10 MG/1
TABLET, COATED ORAL
Qty: 90 TABLET | Refills: 1 | Status: SHIPPED | OUTPATIENT
Start: 2023-10-30

## 2023-11-10 ENCOUNTER — TELEPHONE (OUTPATIENT)
Dept: FAMILY MEDICINE CLINIC | Facility: CLINIC | Age: 48
End: 2023-11-10

## 2023-11-10 DIAGNOSIS — E11.9 TYPE 2 DIABETES MELLITUS WITHOUT COMPLICATION, WITHOUT LONG-TERM CURRENT USE OF INSULIN (HCC): Primary | ICD-10-CM

## 2023-11-10 NOTE — TELEPHONE ENCOUNTER
----- Message from Jaqueline Snell sent at 11/10/2023 10:34 AM EST -----  Regarding: substitution for ozempic? Contact: [phone]  Hello,    I spoke to a nurse on the phone earlier this week (apologies for forgetting her name) about the pharmacy not being able to fill my ozempic prescription. The pharmacy messages me every few days that they can't fill my ozempic. I asked if trulicity could be a substitute and she said she would ask Dr. Raul Burhc.   I haven't received a response yet. Is there a substitute for ozempic I can use? Thank you for your help!   Erna Nagy

## 2023-12-18 ENCOUNTER — OFFICE VISIT (OUTPATIENT)
Dept: FAMILY MEDICINE CLINIC | Facility: CLINIC | Age: 48
End: 2023-12-18
Payer: COMMERCIAL

## 2023-12-18 VITALS
DIASTOLIC BLOOD PRESSURE: 84 MMHG | OXYGEN SATURATION: 98 % | TEMPERATURE: 97.4 F | HEART RATE: 86 BPM | SYSTOLIC BLOOD PRESSURE: 126 MMHG | WEIGHT: 209.2 LBS | HEIGHT: 71 IN | BODY MASS INDEX: 29.29 KG/M2 | RESPIRATION RATE: 16 BRPM

## 2023-12-18 DIAGNOSIS — E78.2 MIXED HYPERLIPIDEMIA: ICD-10-CM

## 2023-12-18 DIAGNOSIS — Z00.01 ABNORMAL WELLNESS EXAM: ICD-10-CM

## 2023-12-18 DIAGNOSIS — E11.9 TYPE 2 DIABETES MELLITUS WITHOUT COMPLICATION, WITHOUT LONG-TERM CURRENT USE OF INSULIN (HCC): Primary | ICD-10-CM

## 2023-12-18 DIAGNOSIS — I10 ESSENTIAL HYPERTENSION: ICD-10-CM

## 2023-12-18 PROCEDURE — 99396 PREV VISIT EST AGE 40-64: CPT | Performed by: FAMILY MEDICINE

## 2023-12-18 PROCEDURE — 99214 OFFICE O/P EST MOD 30 MIN: CPT | Performed by: FAMILY MEDICINE

## 2023-12-18 NOTE — PROGRESS NOTES
Name: Aaron Harris      : 1975      MRN: [id number]  Encounter Provider: Sharee Mulligan MD  Encounter Date: 2023   Encounter department: [facility] [address] [facility]    Assessment & Plan     1. Type 2 diabetes mellitus without complication, without long-term current use of insulin (HCC)  Assessment & Plan:  A1c is well-controlled but increased from before.  It was discussed about low-carb diet and regular exercise.  Continue on Mounjaro 5 mg weekly.  We will continue to follow fasting glucose and A1c.    Lab Results   Component Value Date    HGBA1C 6.5 (H) 2023     Orders:  -     Albumin / creatinine urine ratio; Future; Expected date: 2023  -     Comprehensive metabolic panel; Future; Expected date: 2023  -     Hemoglobin A1C; Future; Expected date: 2023  -     TSH, 3rd generation with Free T4 reflex; Future; Expected date: 2023  -     Lipid Panel with Direct LDL reflex; Future; Expected date: 2023  -     CBC and differential; Future; Expected date: 2023    2. Mixed hyperlipidemia  Assessment & Plan:  Well-controlled on Crestor 10 mg daily.  Continue same.  We will continue to monitor.      3. Essential hypertension  Assessment & Plan:  Well-controlled on losartan 25 mg daily.  Continue same.  We will continue to monitor.      4. Abnormal wellness exam  Assessment & Plan:  It was discussed about immunizations, diet, exercise and safety measures.      5. BMI 29.0-29.9,adult           Subjective     He is here today for follow-up multiple medical problems.  He has been taking his medications.  He denies any side effect from his medications.  He had his blood work done recently showed A1c well-controlled at 6.5 but increased from before.  All the rest of his blood work came back stable.  He denies any complaint today.      Review of Systems   Constitutional:  Negative for chills and fever.   HENT:  Negative for trouble swallowing.    Eyes:   Negative for visual disturbance.   Respiratory:  Negative for cough and shortness of breath.    Cardiovascular:  Negative for chest pain and palpitations.   Gastrointestinal:  Negative for abdominal pain, blood in stool and vomiting.   Endocrine: Negative for cold intolerance and heat intolerance.   Genitourinary:  Negative for difficulty urinating and dysuria.   Musculoskeletal:  Negative for gait problem.   Skin:  Negative for rash.   Neurological:  Negative for dizziness, syncope and headaches.   Hematological:  Negative for adenopathy.   Psychiatric/Behavioral:  Negative for behavioral problems.        Past Medical History:   Diagnosis Date   • Asthma    • DM (diabetes mellitus) (HCC)    • Hyperlipidemia    • Obesity      History reviewed. No pertinent surgical history.  History reviewed. No pertinent family history.  Social History     Socioeconomic History   • Marital status: /Civil Union     Spouse name: None   • Number of children: None   • Years of education: None   • Highest education level: None   Occupational History   • None   Tobacco Use   • Smoking status: Never   • Smokeless tobacco: Never   • Tobacco comments:     no passive smoke exposure   Vaping Use   • Vaping status: Never Used   Substance and Sexual Activity   • Alcohol use: Yes     Comment: socially   • Drug use: No   • Sexual activity: None   Other Topics Concern   • None   Social History Narrative   • None     Social Determinants of Health     Financial Resource Strain: Not on file   Food Insecurity: Not on file   Transportation Needs: Not on file   Physical Activity: Not on file   Stress: Not on file   Social Connections: Not on file   Intimate Partner Violence: Not on file   Housing Stability: Not on file     Current Outpatient Medications on File Prior to Visit   Medication Sig   • Accu-Chek Guide test strip TEST BLOOD SUGAR TWICE DAILY   • albuterol (VENTOLIN HFA) 90 mcg/act inhaler Inhale 2 puffs every 6 (six) hours as needed for  "wheezing   • aspirin (ECOTRIN LOW STRENGTH) 81 mg EC tablet Take 81 mg by mouth daily   • Blood Glucose Monitoring Suppl (FreeStyle Lite) JCARLOS USE 1 EACH 2 TIMES A DAY   • Blood Glucose Monitoring Suppl w/Device KIT Use 2 (two) times a day   • Cholecalciferol (VITAMIN D3) 2000 units capsule Take 2,000 Units by mouth daily   • Lancets (freestyle) lancets Pt tests twice daily   • losartan (COZAAR) 25 mg tablet Take 1 tablet (25 mg total) by mouth daily   • metFORMIN (GLUCOPHAGE-XR) 500 mg 24 hr tablet TAKE 2 TABLETS BY MOUTH EVERY DAY   • rosuvastatin (CRESTOR) 10 MG tablet TAKE 1 TABLET BY MOUTH EVERY DAY   • azelastine (ASTELIN) 0.1 % nasal spray  (Patient not taking: Reported on 11/5/2021)   • triamcinolone (KENALOG) 0.5 % cream Apply topically 3 (three) times a day (Patient not taking: Reported on 11/5/2021)   • valACYclovir (VALTREX) 1,000 mg tablet Take 1 tablet (1,000 mg total) by mouth 3 (three) times a day for 7 days     Allergies   Allergen Reactions   • Amoxicillin      Immunization History   Administered Date(s) Administered   • COVID-19 MODERNA VACC 0.5 ML IM 02/13/2021, 02/13/2021, 03/20/2021, 03/20/2021   • INFLUENZA 11/04/2014, 11/03/2016, 09/29/2018, 10/14/2021, 11/16/2022   • Influenza, injectable, quadrivalent, preservative free 0.5 mL 09/29/2018, 11/02/2019   • Influenza, recombinant, quadrivalent,injectable, preservative free 10/19/2020   • Pneumococcal Polysaccharide PPV23 03/29/2018   • Tdap 11/04/2014   • Zoster Vaccine Recombinant 06/07/2021, 08/25/2021       Objective     /84 (BP Location: Left arm, Patient Position: Sitting, Cuff Size: Large)   Pulse 86   Temp (!) 97.4 °F (36.3 °C) (Tympanic)   Resp 16   Ht 5' 11\" (1.803 m)   Wt 94.9 kg (209 lb 3.2 oz)   SpO2 98%   BMI 29.18 kg/m²     Physical Exam  Vitals and nursing note reviewed.   Constitutional:       Appearance: He is well-developed.   HENT:      Head: Normocephalic and atraumatic.   Eyes:      Pupils: Pupils are equal, " round, and reactive to light.   Cardiovascular:      Rate and Rhythm: Normal rate and regular rhythm.      Heart sounds: Normal heart sounds.   Pulmonary:      Effort: Pulmonary effort is normal.      Breath sounds: Normal breath sounds.   Abdominal:      General: Bowel sounds are normal.      Palpations: Abdomen is soft.   Musculoskeletal:      Cervical back: Normal range of motion and neck supple.   Lymphadenopathy:      Cervical: No cervical adenopathy.   Skin:     General: Skin is warm.      Findings: No rash.   Neurological:      Mental Status: He is alert and oriented to person, place, and time.       Sharee Mulligan MD  BMI Counseling: Body mass index is 29.18 kg/m². The BMI is above normal. Nutrition recommendations include reducing portion sizes, decreasing overall calorie intake, and 3-5 servings of fruits/vegetables daily. Exercise recommendations include moderate aerobic physical activity for 150 minutes/week.

## 2023-12-27 DIAGNOSIS — E11.9 TYPE 2 DIABETES MELLITUS WITHOUT COMPLICATION, WITHOUT LONG-TERM CURRENT USE OF INSULIN (HCC): Primary | ICD-10-CM

## 2023-12-31 NOTE — ASSESSMENT & PLAN NOTE
A1c is well-controlled but increased from before.  It was discussed about low-carb diet and regular exercise.  Continue on Mounjaro 5 mg weekly.  We will continue to follow fasting glucose and A1c.    Lab Results   Component Value Date    HGBA1C 6.5 (H) 12/08/2023

## 2024-01-03 DIAGNOSIS — I10 ESSENTIAL HYPERTENSION: ICD-10-CM

## 2024-01-03 DIAGNOSIS — E11.9 TYPE 2 DIABETES MELLITUS WITHOUT COMPLICATION, WITHOUT LONG-TERM CURRENT USE OF INSULIN (HCC): ICD-10-CM

## 2024-01-03 RX ORDER — BLOOD SUGAR DIAGNOSTIC
STRIP MISCELLANEOUS 2 TIMES DAILY
Qty: 200 STRIP | Refills: 1 | Status: SHIPPED | OUTPATIENT
Start: 2024-01-03

## 2024-01-03 RX ORDER — LOSARTAN POTASSIUM 25 MG/1
25 TABLET ORAL DAILY
Qty: 90 TABLET | Refills: 1 | Status: SHIPPED | OUTPATIENT
Start: 2024-01-03

## 2024-02-02 DIAGNOSIS — Z00.6 ENCOUNTER FOR EXAMINATION FOR NORMAL COMPARISON OR CONTROL IN CLINICAL RESEARCH PROGRAM: ICD-10-CM

## 2024-02-14 ENCOUNTER — APPOINTMENT (OUTPATIENT)
Dept: LAB | Facility: IMAGING CENTER | Age: 49
End: 2024-02-14

## 2024-02-14 DIAGNOSIS — Z00.6 ENCOUNTER FOR EXAMINATION FOR NORMAL COMPARISON OR CONTROL IN CLINICAL RESEARCH PROGRAM: ICD-10-CM

## 2024-02-14 PROCEDURE — 36415 COLL VENOUS BLD VENIPUNCTURE: CPT

## 2024-03-13 ENCOUNTER — APPOINTMENT (OUTPATIENT)
Dept: LAB | Facility: IMAGING CENTER | Age: 49
End: 2024-03-13
Payer: COMMERCIAL

## 2024-03-13 DIAGNOSIS — E11.9 TYPE 2 DIABETES MELLITUS WITHOUT COMPLICATION, WITHOUT LONG-TERM CURRENT USE OF INSULIN (HCC): ICD-10-CM

## 2024-03-13 LAB
ALBUMIN SERPL BCP-MCNC: 4.5 G/DL (ref 3.5–5)
ALP SERPL-CCNC: 70 U/L (ref 34–104)
ALT SERPL W P-5'-P-CCNC: 21 U/L (ref 7–52)
ANION GAP SERPL CALCULATED.3IONS-SCNC: 8 MMOL/L (ref 4–13)
AST SERPL W P-5'-P-CCNC: 18 U/L (ref 13–39)
BASOPHILS # BLD AUTO: 0.04 THOUSANDS/ÂΜL (ref 0–0.1)
BASOPHILS NFR BLD AUTO: 1 % (ref 0–1)
BILIRUB SERPL-MCNC: 0.88 MG/DL (ref 0.2–1)
BUN SERPL-MCNC: 10 MG/DL (ref 5–25)
CALCIUM SERPL-MCNC: 9.5 MG/DL (ref 8.4–10.2)
CHLORIDE SERPL-SCNC: 102 MMOL/L (ref 96–108)
CHOLEST SERPL-MCNC: 139 MG/DL
CO2 SERPL-SCNC: 27 MMOL/L (ref 21–32)
CREAT SERPL-MCNC: 0.94 MG/DL (ref 0.6–1.3)
CREAT UR-MCNC: 158.1 MG/DL
EOSINOPHIL # BLD AUTO: 0.11 THOUSAND/ÂΜL (ref 0–0.61)
EOSINOPHIL NFR BLD AUTO: 2 % (ref 0–6)
ERYTHROCYTE [DISTWIDTH] IN BLOOD BY AUTOMATED COUNT: 13.3 % (ref 11.6–15.1)
EST. AVERAGE GLUCOSE BLD GHB EST-MCNC: 148 MG/DL
GFR SERPL CREATININE-BSD FRML MDRD: 95 ML/MIN/1.73SQ M
GLUCOSE P FAST SERPL-MCNC: 90 MG/DL (ref 65–99)
HBA1C MFR BLD: 6.8 %
HCT VFR BLD AUTO: 48.5 % (ref 36.5–49.3)
HDLC SERPL-MCNC: 40 MG/DL
HGB BLD-MCNC: 15.8 G/DL (ref 12–17)
IMM GRANULOCYTES # BLD AUTO: 0.02 THOUSAND/UL (ref 0–0.2)
IMM GRANULOCYTES NFR BLD AUTO: 0 % (ref 0–2)
LDLC SERPL CALC-MCNC: 67 MG/DL (ref 0–100)
LYMPHOCYTES # BLD AUTO: 1.67 THOUSANDS/ÂΜL (ref 0.6–4.47)
LYMPHOCYTES NFR BLD AUTO: 28 % (ref 14–44)
MCH RBC QN AUTO: 29.9 PG (ref 26.8–34.3)
MCHC RBC AUTO-ENTMCNC: 32.6 G/DL (ref 31.4–37.4)
MCV RBC AUTO: 92 FL (ref 82–98)
MICROALBUMIN UR-MCNC: 9 MG/L
MICROALBUMIN/CREAT 24H UR: 6 MG/G CREATININE (ref 0–30)
MONOCYTES # BLD AUTO: 0.53 THOUSAND/ÂΜL (ref 0.17–1.22)
MONOCYTES NFR BLD AUTO: 9 % (ref 4–12)
NEUTROPHILS # BLD AUTO: 3.5 THOUSANDS/ÂΜL (ref 1.85–7.62)
NEUTS SEG NFR BLD AUTO: 60 % (ref 43–75)
NRBC BLD AUTO-RTO: 0 /100 WBCS
PLATELET # BLD AUTO: 212 THOUSANDS/UL (ref 149–390)
PMV BLD AUTO: 10.4 FL (ref 8.9–12.7)
POTASSIUM SERPL-SCNC: 4.2 MMOL/L (ref 3.5–5.3)
PROT SERPL-MCNC: 7.3 G/DL (ref 6.4–8.4)
RBC # BLD AUTO: 5.29 MILLION/UL (ref 3.88–5.62)
SODIUM SERPL-SCNC: 137 MMOL/L (ref 135–147)
TRIGL SERPL-MCNC: 162 MG/DL
TSH SERPL DL<=0.05 MIU/L-ACNC: 1.28 UIU/ML (ref 0.45–4.5)
WBC # BLD AUTO: 5.87 THOUSAND/UL (ref 4.31–10.16)

## 2024-03-13 PROCEDURE — 80053 COMPREHEN METABOLIC PANEL: CPT

## 2024-03-13 PROCEDURE — 80061 LIPID PANEL: CPT

## 2024-03-13 PROCEDURE — 82570 ASSAY OF URINE CREATININE: CPT

## 2024-03-13 PROCEDURE — 36415 COLL VENOUS BLD VENIPUNCTURE: CPT

## 2024-03-13 PROCEDURE — 84443 ASSAY THYROID STIM HORMONE: CPT

## 2024-03-13 PROCEDURE — 85025 COMPLETE CBC W/AUTO DIFF WBC: CPT

## 2024-03-13 PROCEDURE — 82043 UR ALBUMIN QUANTITATIVE: CPT

## 2024-03-13 PROCEDURE — 83036 HEMOGLOBIN GLYCOSYLATED A1C: CPT

## 2024-03-18 ENCOUNTER — OFFICE VISIT (OUTPATIENT)
Dept: FAMILY MEDICINE CLINIC | Facility: CLINIC | Age: 49
End: 2024-03-18
Payer: COMMERCIAL

## 2024-03-18 VITALS
DIASTOLIC BLOOD PRESSURE: 80 MMHG | RESPIRATION RATE: 16 BRPM | TEMPERATURE: 97.9 F | OXYGEN SATURATION: 99 % | WEIGHT: 211.2 LBS | HEART RATE: 76 BPM | SYSTOLIC BLOOD PRESSURE: 116 MMHG | HEIGHT: 71 IN | BODY MASS INDEX: 29.57 KG/M2

## 2024-03-18 DIAGNOSIS — I10 ESSENTIAL HYPERTENSION: ICD-10-CM

## 2024-03-18 DIAGNOSIS — E11.9 TYPE 2 DIABETES MELLITUS WITHOUT COMPLICATION, WITHOUT LONG-TERM CURRENT USE OF INSULIN (HCC): Primary | ICD-10-CM

## 2024-03-18 DIAGNOSIS — J45.20 MILD INTERMITTENT ASTHMA WITHOUT COMPLICATION: ICD-10-CM

## 2024-03-18 DIAGNOSIS — E78.2 MIXED HYPERLIPIDEMIA: ICD-10-CM

## 2024-03-18 PROCEDURE — 99214 OFFICE O/P EST MOD 30 MIN: CPT | Performed by: FAMILY MEDICINE

## 2024-03-18 NOTE — PROGRESS NOTES
Name: Aaron Harris      : 1975      MRN: [id number]  Encounter Provider: Sharee Mulligan MD  Encounter Date: 3/18/2024   Encounter department: [facility] [address] [facility]    Assessment & Plan     1. Type 2 diabetes mellitus without complication, without long-term current use of insulin (HCC)  Assessment & Plan:  A1c is well-controlled but increased from before.  It was discussed about low-carb diet and regular exercise.  Continue on Mounjaro 5 mg weekly.  We will continue to follow fasting glucose and A1c.    Lab Results   Component Value Date    HGBA1C 6.8 (H) 2024     Orders:  -     Hemoglobin A1C; Future; Expected date: 2024  -     CBC and differential; Future; Expected date: 2024  -     Lipid Panel with Direct LDL reflex; Future; Expected date: 2024  -     TSH, 3rd generation with Free T4 reflex; Future; Expected date: 2024  -     Comprehensive metabolic panel; Future; Expected date: 2024  -     tirzepatide (Mounjaro) 5 MG/0.5ML; Inject 0.5 mL (5 mg total) under the skin every 7 days    2. Essential hypertension  Assessment & Plan:  Well-controlled on losartan 25 mg daily.  Continue same.  We will continue to monitor.      3. Mild intermittent asthma without complication  Assessment & Plan:  Stable.  Continue on albuterol as needed.  We will continue to monitor.      4. Mixed hyperlipidemia  Assessment & Plan:  Well-controlled on Crestor 10 mg daily.  Continue same.  We will continue to monitor.          Depression Screening and Follow-up Plan: Patient was screened for depression during today's encounter. They screened negative with a PHQ-2 score of 0.        Subjective     Is here today for follow-up multiple medical problems.  He has been taking his medications.  He denies any side effects from his medications.  He has not been watching his diet very well.  His A1c came back well-controlled but elevated from before.  All the rest of the blood work  came back stable.  He denies any complaint today.    Hypertension  Pertinent negatives include no chest pain, headaches, palpitations or shortness of breath.     Review of Systems   Constitutional:  Negative for chills and fever.   HENT:  Negative for trouble swallowing.    Eyes:  Negative for visual disturbance.   Respiratory:  Negative for cough and shortness of breath.    Cardiovascular:  Negative for chest pain and palpitations.   Gastrointestinal:  Negative for abdominal pain, blood in stool and vomiting.   Endocrine: Negative for cold intolerance and heat intolerance.   Genitourinary:  Negative for difficulty urinating and dysuria.   Musculoskeletal:  Negative for gait problem.   Skin:  Negative for rash.   Neurological:  Negative for dizziness, syncope and headaches.   Hematological:  Negative for adenopathy.   Psychiatric/Behavioral:  Negative for behavioral problems.        Past Medical History:   Diagnosis Date   • Asthma    • DM (diabetes mellitus) (HCC)    • Hyperlipidemia    • Obesity      History reviewed. No pertinent surgical history.  History reviewed. No pertinent family history.  Social History     Socioeconomic History   • Marital status: /Civil Union     Spouse name: None   • Number of children: None   • Years of education: None   • Highest education level: None   Occupational History   • None   Tobacco Use   • Smoking status: Never   • Smokeless tobacco: Never   • Tobacco comments:     no passive smoke exposure   Vaping Use   • Vaping status: Never Used   Substance and Sexual Activity   • Alcohol use: Yes     Comment: socially   • Drug use: No   • Sexual activity: None   Other Topics Concern   • None   Social History Narrative   • None     Social Determinants of Health     Financial Resource Strain: Not on file   Food Insecurity: Not on file   Transportation Needs: Not on file   Physical Activity: Not on file   Stress: Not on file   Social Connections: Not on file   Intimate Partner  Violence: Not on file   Housing Stability: Not on file     Current Outpatient Medications on File Prior to Visit   Medication Sig   • Accu-Chek Mignon Plus test strip TEST BLOOD SUGAR TWICE DAILY   • albuterol (VENTOLIN HFA) 90 mcg/act inhaler Inhale 2 puffs every 6 (six) hours as needed for wheezing   • aspirin (ECOTRIN LOW STRENGTH) 81 mg EC tablet Take 81 mg by mouth daily   • Blood Glucose Monitoring Suppl (FreeStyle Lite) JCARLOS USE 1 EACH 2 TIMES A DAY   • Blood Glucose Monitoring Suppl w/Device KIT Use 2 (two) times a day   • Cholecalciferol (VITAMIN D3) 2000 units capsule Take 2,000 Units by mouth daily   • Lancets (freestyle) lancets Pt tests twice daily   • losartan (COZAAR) 25 mg tablet TAKE 1 TABLET (25 MG TOTAL) BY MOUTH DAILY.   • metFORMIN (GLUCOPHAGE-XR) 500 mg 24 hr tablet TAKE 2 TABLETS BY MOUTH EVERY DAY   • rosuvastatin (CRESTOR) 10 MG tablet TAKE 1 TABLET BY MOUTH EVERY DAY   • azelastine (ASTELIN) 0.1 % nasal spray  (Patient not taking: Reported on 11/5/2021)   • triamcinolone (KENALOG) 0.5 % cream Apply topically 3 (three) times a day (Patient not taking: Reported on 11/5/2021)   • valACYclovir (VALTREX) 1,000 mg tablet Take 1 tablet (1,000 mg total) by mouth 3 (three) times a day for 7 days     Allergies   Allergen Reactions   • Amoxicillin      Immunization History   Administered Date(s) Administered   • COVID-19 MODERNA VACC 0.5 ML IM 02/13/2021, 02/13/2021, 03/20/2021, 03/20/2021   • INFLUENZA 11/04/2014, 11/03/2016, 09/29/2018, 10/14/2021, 11/16/2022   • Influenza, injectable, quadrivalent, preservative free 0.5 mL 09/29/2018, 11/02/2019   • Influenza, recombinant, quadrivalent,injectable, preservative free 10/19/2020   • Pneumococcal Polysaccharide PPV23 03/29/2018   • Tdap 11/04/2014   • Zoster Vaccine Recombinant 06/07/2021, 08/25/2021       Objective     /80 (BP Location: Left arm, Patient Position: Sitting, Cuff Size: Large)   Pulse 76   Temp 97.9 °F (36.6 °C) (Tympanic)   Resp  "16   Ht 5' 11\" (1.803 m)   Wt 95.8 kg (211 lb 3.2 oz)   SpO2 99%   BMI 29.46 kg/m²     Physical Exam  Vitals and nursing note reviewed.   Constitutional:       Appearance: He is well-developed.   HENT:      Head: Normocephalic and atraumatic.   Eyes:      Pupils: Pupils are equal, round, and reactive to light.   Cardiovascular:      Rate and Rhythm: Normal rate and regular rhythm.      Pulses: no weak pulses.           Dorsalis pedis pulses are 2+ on the right side and 2+ on the left side.      Heart sounds: Normal heart sounds.   Pulmonary:      Effort: Pulmonary effort is normal.      Breath sounds: Normal breath sounds.   Abdominal:      General: Bowel sounds are normal.      Palpations: Abdomen is soft.   Musculoskeletal:      Cervical back: Normal range of motion and neck supple.   Feet:      Right foot:      Skin integrity: No ulcer, skin breakdown, erythema, warmth, callus or dry skin.      Left foot:      Skin integrity: No ulcer, skin breakdown, erythema, warmth, callus or dry skin.   Lymphadenopathy:      Cervical: No cervical adenopathy.   Skin:     General: Skin is warm.      Findings: No rash.   Neurological:      Mental Status: He is alert and oriented to person, place, and time.       Sharee Mulligan MD      Diabetic Foot Exam    Patient's shoes and socks removed.    Right Foot/Ankle   Right Foot Inspection  Skin Exam: skin normal and skin intact. No dry skin, no warmth, no callus, no erythema, no maceration, no abnormal color, no pre-ulcer, no ulcer and no callus.     Toe Exam: ROM and strength within normal limits.     Sensory   Monofilament testing: intact    Vascular  Capillary refills: < 3 seconds  The right DP pulse is 2+.     Left Foot/Ankle  Left Foot Inspection  Skin Exam: skin normal and skin intact. No dry skin, no warmth, no erythema, no maceration, normal color, no pre-ulcer, no ulcer and no callus.     Toe Exam: ROM and strength within normal limits.     Sensory   Monofilament " testing: intact    Vascular  Capillary refills: < 3 seconds  The left DP pulse is 2+.     Assign Risk Category  No deformity present  No loss of protective sensation  No weak pulses  Risk: 0

## 2024-03-20 LAB
APOB+LDLR+PCSK9 GENE MUT ANL BLD/T: NOT DETECTED
BRCA1+BRCA2 DEL+DUP + FULL MUT ANL BLD/T: NOT DETECTED
MLH1+MSH2+MSH6+PMS2 GN DEL+DUP+FUL M: NOT DETECTED

## 2024-03-21 NOTE — ASSESSMENT & PLAN NOTE
A1c is well-controlled but increased from before.  It was discussed about low-carb diet and regular exercise.  Continue on Mounjaro 5 mg weekly.  We will continue to follow fasting glucose and A1c.    Lab Results   Component Value Date    HGBA1C 6.8 (H) 03/13/2024

## 2024-04-26 DIAGNOSIS — E78.2 MIXED HYPERLIPIDEMIA: ICD-10-CM

## 2024-04-26 RX ORDER — ROSUVASTATIN CALCIUM 10 MG/1
TABLET, COATED ORAL
Qty: 90 TABLET | Refills: 1 | Status: SHIPPED | OUTPATIENT
Start: 2024-04-26

## 2024-05-20 DIAGNOSIS — E11.9 TYPE 2 DIABETES MELLITUS WITHOUT COMPLICATION, WITHOUT LONG-TERM CURRENT USE OF INSULIN (HCC): ICD-10-CM

## 2024-06-24 DIAGNOSIS — I10 ESSENTIAL HYPERTENSION: ICD-10-CM

## 2024-06-24 RX ORDER — LOSARTAN POTASSIUM 25 MG/1
25 TABLET ORAL DAILY
Qty: 90 TABLET | Refills: 1 | Status: SHIPPED | OUTPATIENT
Start: 2024-06-24

## 2024-08-17 DIAGNOSIS — E11.9 TYPE 2 DIABETES MELLITUS WITHOUT COMPLICATION, WITHOUT LONG-TERM CURRENT USE OF INSULIN (HCC): ICD-10-CM

## 2024-08-18 RX ORDER — TIRZEPATIDE 5 MG/.5ML
INJECTION, SOLUTION SUBCUTANEOUS
Qty: 2 ML | Refills: 2 | Status: SHIPPED | OUTPATIENT
Start: 2024-08-18

## 2024-09-05 LAB
LEFT EYE DIABETIC RETINOPATHY: NORMAL
RIGHT EYE DIABETIC RETINOPATHY: NORMAL

## 2024-09-12 ENCOUNTER — APPOINTMENT (OUTPATIENT)
Dept: LAB | Facility: IMAGING CENTER | Age: 49
End: 2024-09-12
Payer: COMMERCIAL

## 2024-09-12 DIAGNOSIS — E11.9 TYPE 2 DIABETES MELLITUS WITHOUT COMPLICATION, WITHOUT LONG-TERM CURRENT USE OF INSULIN (HCC): ICD-10-CM

## 2024-09-12 LAB
ALBUMIN SERPL BCG-MCNC: 4.6 G/DL (ref 3.5–5)
ALP SERPL-CCNC: 64 U/L (ref 34–104)
ALT SERPL W P-5'-P-CCNC: 22 U/L (ref 7–52)
ANION GAP SERPL CALCULATED.3IONS-SCNC: 9 MMOL/L (ref 4–13)
AST SERPL W P-5'-P-CCNC: 18 U/L (ref 13–39)
BASOPHILS # BLD AUTO: 0.04 THOUSANDS/ΜL (ref 0–0.1)
BASOPHILS NFR BLD AUTO: 1 % (ref 0–1)
BILIRUB SERPL-MCNC: 0.57 MG/DL (ref 0.2–1)
BUN SERPL-MCNC: 11 MG/DL (ref 5–25)
CALCIUM SERPL-MCNC: 9.4 MG/DL (ref 8.4–10.2)
CHLORIDE SERPL-SCNC: 102 MMOL/L (ref 96–108)
CHOLEST SERPL-MCNC: 137 MG/DL
CO2 SERPL-SCNC: 27 MMOL/L (ref 21–32)
CREAT SERPL-MCNC: 0.88 MG/DL (ref 0.6–1.3)
EOSINOPHIL # BLD AUTO: 0.1 THOUSAND/ΜL (ref 0–0.61)
EOSINOPHIL NFR BLD AUTO: 2 % (ref 0–6)
ERYTHROCYTE [DISTWIDTH] IN BLOOD BY AUTOMATED COUNT: 13.1 % (ref 11.6–15.1)
EST. AVERAGE GLUCOSE BLD GHB EST-MCNC: 126 MG/DL
GFR SERPL CREATININE-BSD FRML MDRD: 100 ML/MIN/1.73SQ M
GLUCOSE P FAST SERPL-MCNC: 95 MG/DL (ref 65–99)
HBA1C MFR BLD: 6 %
HCT VFR BLD AUTO: 47.7 % (ref 36.5–49.3)
HDLC SERPL-MCNC: 41 MG/DL
HGB BLD-MCNC: 15.7 G/DL (ref 12–17)
IMM GRANULOCYTES # BLD AUTO: 0.01 THOUSAND/UL (ref 0–0.2)
IMM GRANULOCYTES NFR BLD AUTO: 0 % (ref 0–2)
LDLC SERPL CALC-MCNC: 70 MG/DL (ref 0–100)
LYMPHOCYTES # BLD AUTO: 1.67 THOUSANDS/ΜL (ref 0.6–4.47)
LYMPHOCYTES NFR BLD AUTO: 25 % (ref 14–44)
MCH RBC QN AUTO: 29.8 PG (ref 26.8–34.3)
MCHC RBC AUTO-ENTMCNC: 32.9 G/DL (ref 31.4–37.4)
MCV RBC AUTO: 91 FL (ref 82–98)
MONOCYTES # BLD AUTO: 0.52 THOUSAND/ΜL (ref 0.17–1.22)
MONOCYTES NFR BLD AUTO: 8 % (ref 4–12)
NEUTROPHILS # BLD AUTO: 4.42 THOUSANDS/ΜL (ref 1.85–7.62)
NEUTS SEG NFR BLD AUTO: 64 % (ref 43–75)
NRBC BLD AUTO-RTO: 0 /100 WBCS
PLATELET # BLD AUTO: 216 THOUSANDS/UL (ref 149–390)
PMV BLD AUTO: 11.1 FL (ref 8.9–12.7)
POTASSIUM SERPL-SCNC: 4.2 MMOL/L (ref 3.5–5.3)
PROT SERPL-MCNC: 7.2 G/DL (ref 6.4–8.4)
RBC # BLD AUTO: 5.26 MILLION/UL (ref 3.88–5.62)
SODIUM SERPL-SCNC: 138 MMOL/L (ref 135–147)
TRIGL SERPL-MCNC: 132 MG/DL
TSH SERPL DL<=0.05 MIU/L-ACNC: 0.86 UIU/ML (ref 0.45–4.5)
WBC # BLD AUTO: 6.76 THOUSAND/UL (ref 4.31–10.16)

## 2024-09-12 PROCEDURE — 84443 ASSAY THYROID STIM HORMONE: CPT

## 2024-09-12 PROCEDURE — 36415 COLL VENOUS BLD VENIPUNCTURE: CPT

## 2024-09-12 PROCEDURE — 85025 COMPLETE CBC W/AUTO DIFF WBC: CPT

## 2024-09-12 PROCEDURE — 80053 COMPREHEN METABOLIC PANEL: CPT

## 2024-09-12 PROCEDURE — 80061 LIPID PANEL: CPT

## 2024-09-12 PROCEDURE — 83036 HEMOGLOBIN GLYCOSYLATED A1C: CPT

## 2024-09-16 ENCOUNTER — OFFICE VISIT (OUTPATIENT)
Dept: FAMILY MEDICINE CLINIC | Facility: CLINIC | Age: 49
End: 2024-09-16
Payer: COMMERCIAL

## 2024-09-16 VITALS
SYSTOLIC BLOOD PRESSURE: 136 MMHG | OXYGEN SATURATION: 98 % | DIASTOLIC BLOOD PRESSURE: 88 MMHG | HEIGHT: 71 IN | TEMPERATURE: 97.8 F | HEART RATE: 72 BPM | WEIGHT: 207 LBS | BODY MASS INDEX: 28.98 KG/M2 | RESPIRATION RATE: 16 BRPM

## 2024-09-16 DIAGNOSIS — J45.20 MILD INTERMITTENT ASTHMA WITHOUT COMPLICATION: ICD-10-CM

## 2024-09-16 DIAGNOSIS — I10 ESSENTIAL HYPERTENSION: ICD-10-CM

## 2024-09-16 DIAGNOSIS — E78.5 HYPERLIPIDEMIA, UNSPECIFIED HYPERLIPIDEMIA TYPE: ICD-10-CM

## 2024-09-16 DIAGNOSIS — E11.9 TYPE 2 DIABETES MELLITUS WITHOUT COMPLICATION, WITHOUT LONG-TERM CURRENT USE OF INSULIN (HCC): Primary | ICD-10-CM

## 2024-09-16 DIAGNOSIS — Z23 NEED FOR INFLUENZA VACCINATION: ICD-10-CM

## 2024-09-16 DIAGNOSIS — Z12.12 ENCOUNTER FOR COLORECTAL CANCER SCREENING: ICD-10-CM

## 2024-09-16 DIAGNOSIS — Z12.11 ENCOUNTER FOR COLORECTAL CANCER SCREENING: ICD-10-CM

## 2024-09-16 PROCEDURE — 90471 IMMUNIZATION ADMIN: CPT

## 2024-09-16 PROCEDURE — 90656 IIV3 VACC NO PRSV 0.5 ML IM: CPT

## 2024-09-16 PROCEDURE — 99214 OFFICE O/P EST MOD 30 MIN: CPT | Performed by: FAMILY MEDICINE

## 2024-09-16 NOTE — PROGRESS NOTES
Ambulatory Visit  Name: Aaron Harris      : 1975      MRN: [id number]  Encounter Provider: Sharee Mulligan MD  Encounter Date: 2024   Encounter department: [facility] [address] [facility]    Assessment & Plan  Type 2 diabetes mellitus without complication, without long-term current use of insulin (HCC)  Recent blood work came back stable. Increased dose of Mounjaro to 7.5mg. Discussed low-carb diet and regular exercise. Blood work ordered. Follow-up in 3 months.   Lab Results   Component Value Date    HGBA1C 6.0 (H) 2024       Orders:    Albumin / creatinine urine ratio; Future    Comprehensive metabolic panel; Future    Hemoglobin A1C; Future    CBC and differential; Future    Lipid Panel with Direct LDL reflex; Future    TSH, 3rd generation with Free T4 reflex; Future    Albumin / creatinine urine ratio    Comprehensive metabolic panel    Hemoglobin A1C    CBC and differential    Lipid Panel with Direct LDL reflex    TSH, 3rd generation with Free T4 reflex    tirzepatide (Mounjaro) 7.5 MG/0.5ML; Inject 0.5 mL (7.5 mg total) under the skin every 7 days    Encounter for colorectal cancer screening  Screening Cologuard ordered.   Orders:    Cologuard    Hyperlipidemia, unspecified hyperlipidemia type  Recent blood work came back WNL. Pt advised to continue with Crestor 10mg. Will continue to monitor. Blood work ordered.          Need for influenza vaccination    Orders:    Fluzone 0.5 mL IM    Essential hypertension  Controlled. Discussed low-sodium and low-carb diet, and routine exercise. Pt advised to continue with home monitoring, as well as to continue with losartan 25mg. Will continue to monitor.          Mild intermittent asthma without complication  Stable.  Continue on albuterol as needed.  We will continue to monitor.              History of Present Illness     RS is a 48yo male with a PMH of DM II, hyperlipidemia, HTN, presenting to the office for a 6 month follow up. Pt  presents with no acute complaints today. Pt denies chest pain, SOB, palpitations, and all other ROS were negative. Pt has been taking Mounjaro 5mg weekly, and has seen a plateau in weight loss. Pt is currently taking all other medications as prescribed, and notes no complications. All recent blood work came back stable.    Diabetes  Pertinent negatives for hypoglycemia include no seizures. Pertinent negatives for diabetes include no chest pain.   Hyperlipidemia  Pertinent negatives include no chest pain or shortness of breath.   Hypertension  Pertinent negatives include no chest pain, palpitations or shortness of breath.     Review of Systems   Constitutional:  Negative for chills and fever.   HENT:  Negative for ear pain and sore throat.    Eyes:  Negative for pain and visual disturbance.   Respiratory:  Negative for cough and shortness of breath.    Cardiovascular:  Negative for chest pain and palpitations.   Gastrointestinal:  Negative for abdominal pain and vomiting.   Genitourinary:  Negative for dysuria and hematuria.   Musculoskeletal:  Negative for arthralgias and back pain.   Skin:  Negative for color change and rash.   Neurological:  Negative for seizures and syncope.   All other systems reviewed and are negative.    Past Medical History:   Diagnosis Date    Asthma     DM (diabetes mellitus) (HCC)     Hyperlipidemia     Obesity      History reviewed. No pertinent surgical history.  History reviewed. No pertinent family history.  Social History     Tobacco Use    Smoking status: Never    Smokeless tobacco: Never    Tobacco comments:     no passive smoke exposure   Vaping Use    Vaping status: Never Used   Substance and Sexual Activity    Alcohol use: Yes     Comment: socially    Drug use: No    Sexual activity: Not on file     Current Outpatient Medications on File Prior to Visit   Medication Sig    Accu-Chek Mignon Plus test strip TEST BLOOD SUGAR TWICE DAILY    albuterol (VENTOLIN HFA) 90 mcg/act inhaler  "Inhale 2 puffs every 6 (six) hours as needed for wheezing    aspirin (ECOTRIN LOW STRENGTH) 81 mg EC tablet Take 81 mg by mouth daily    Blood Glucose Monitoring Suppl (FreeStyle Lite) JCARLOS USE 1 EACH 2 TIMES A DAY    Blood Glucose Monitoring Suppl w/Device KIT Use 2 (two) times a day    Cholecalciferol (VITAMIN D3) 2000 units capsule Take 2,000 Units by mouth daily    Lancets (freestyle) lancets Pt tests twice daily    losartan (COZAAR) 25 mg tablet TAKE 1 TABLET (25 MG TOTAL) BY MOUTH DAILY.    metFORMIN (GLUCOPHAGE-XR) 500 mg 24 hr tablet TAKE 2 TABLETS BY MOUTH EVERY DAY    rosuvastatin (CRESTOR) 10 MG tablet TAKE 1 TABLET BY MOUTH EVERY DAY    [DISCONTINUED] tirzepatide (Mounjaro) 5 MG/0.5ML INJECT 0.5 ML (5 MG TOTAL) UNDER THE SKIN EVERY 7 DAYS    azelastine (ASTELIN) 0.1 % nasal spray  (Patient not taking: Reported on 11/5/2021)    triamcinolone (KENALOG) 0.5 % cream Apply topically 3 (three) times a day (Patient not taking: Reported on 11/5/2021)    valACYclovir (VALTREX) 1,000 mg tablet Take 1 tablet (1,000 mg total) by mouth 3 (three) times a day for 7 days     Allergies   Allergen Reactions    Amoxicillin      Immunization History   Administered Date(s) Administered    COVID-19 MODERNA VACC 0.5 ML IM 02/13/2021, 02/13/2021, 03/20/2021, 03/20/2021    INFLUENZA 11/04/2014, 11/03/2016, 09/29/2018, 10/14/2021, 11/16/2022    Influenza, injectable, quadrivalent, preservative free 0.5 mL 09/29/2018, 11/02/2019    Influenza, recombinant, quadrivalent,injectable, preservative free 10/19/2020    Influenza, seasonal, injectable, preservative free 09/16/2024    Pneumococcal Polysaccharide PPV23 03/29/2018    Tdap 11/04/2014    Zoster Vaccine Recombinant 06/07/2021, 08/25/2021     Objective     /88 (BP Location: Left arm, Patient Position: Sitting, Cuff Size: Adult)   Pulse 72   Temp 97.8 °F (36.6 °C) (Tympanic)   Resp 16   Ht 5' 11\" (1.803 m)   Wt 93.9 kg (207 lb)   SpO2 98%   BMI 28.87 kg/m² "     Physical Exam  Vitals and nursing note reviewed.   Constitutional:       General: He is not in acute distress.     Appearance: Normal appearance. He is well-developed.   HENT:      Head: Normocephalic and atraumatic.      Right Ear: Tympanic membrane, ear canal and external ear normal. There is no impacted cerumen.      Left Ear: Tympanic membrane, ear canal and external ear normal. There is no impacted cerumen.   Eyes:      Conjunctiva/sclera: Conjunctivae normal.   Cardiovascular:      Rate and Rhythm: Normal rate and regular rhythm.      Heart sounds: Normal heart sounds. No murmur heard.     No friction rub. No gallop.   Pulmonary:      Effort: Pulmonary effort is normal. No respiratory distress.      Breath sounds: Normal breath sounds. No wheezing, rhonchi or rales.   Musculoskeletal:         General: No swelling.      Cervical back: Neck supple.      Right lower leg: No edema.      Left lower leg: No edema.   Skin:     General: Skin is warm and dry.      Capillary Refill: Capillary refill takes less than 2 seconds.   Neurological:      Mental Status: He is alert.   Psychiatric:         Mood and Affect: Mood normal.

## 2024-09-16 NOTE — ASSESSMENT & PLAN NOTE
Recent blood work came back WNL. Pt advised to continue with Crestor 10mg. Will continue to monitor. Blood work ordered.

## 2024-09-16 NOTE — ASSESSMENT & PLAN NOTE
Controlled. Discussed low-sodium and low-carb diet, and routine exercise. Pt advised to continue with home monitoring, as well as to continue with losartan 25mg. Will continue to monitor.

## 2024-09-16 NOTE — ASSESSMENT & PLAN NOTE
Recent blood work came back stable. Increased dose of Mounjaro to 7.5mg. Discussed low-carb diet and regular exercise. Blood work ordered. Follow-up in 3 months.   Lab Results   Component Value Date    HGBA1C 6.0 (H) 09/12/2024       Orders:    Albumin / creatinine urine ratio; Future    Comprehensive metabolic panel; Future    Hemoglobin A1C; Future    CBC and differential; Future    Lipid Panel with Direct LDL reflex; Future    TSH, 3rd generation with Free T4 reflex; Future    Albumin / creatinine urine ratio    Comprehensive metabolic panel    Hemoglobin A1C    CBC and differential    Lipid Panel with Direct LDL reflex    TSH, 3rd generation with Free T4 reflex    tirzepatide (Mounjaro) 7.5 MG/0.5ML; Inject 0.5 mL (7.5 mg total) under the skin every 7 days

## 2024-10-11 LAB — COLOGUARD RESULT REPORTABLE: NEGATIVE

## 2024-10-16 DIAGNOSIS — E78.2 MIXED HYPERLIPIDEMIA: ICD-10-CM

## 2024-10-16 RX ORDER — ROSUVASTATIN CALCIUM 10 MG/1
10 TABLET, COATED ORAL DAILY
Qty: 90 TABLET | Refills: 1 | Status: SHIPPED | OUTPATIENT
Start: 2024-10-16

## 2024-10-16 NOTE — TELEPHONE ENCOUNTER
Fax from [facility] requesting 90 day refill of Rosuvastatin 10mg    Last seen September 2024

## 2024-12-06 ENCOUNTER — APPOINTMENT (OUTPATIENT)
Dept: LAB | Facility: IMAGING CENTER | Age: 49
End: 2024-12-06
Payer: COMMERCIAL

## 2024-12-06 LAB
ALBUMIN SERPL BCG-MCNC: 4.8 G/DL (ref 3.5–5)
ALP SERPL-CCNC: 61 U/L (ref 34–104)
ALT SERPL W P-5'-P-CCNC: 21 U/L (ref 7–52)
ANION GAP SERPL CALCULATED.3IONS-SCNC: 8 MMOL/L (ref 4–13)
AST SERPL W P-5'-P-CCNC: 17 U/L (ref 13–39)
BASOPHILS # BLD AUTO: 0.04 THOUSANDS/ΜL (ref 0–0.1)
BASOPHILS NFR BLD AUTO: 1 % (ref 0–1)
BILIRUB SERPL-MCNC: 0.8 MG/DL (ref 0.2–1)
BUN SERPL-MCNC: 11 MG/DL (ref 5–25)
CALCIUM SERPL-MCNC: 9.6 MG/DL (ref 8.4–10.2)
CHLORIDE SERPL-SCNC: 101 MMOL/L (ref 96–108)
CHOLEST SERPL-MCNC: 130 MG/DL (ref ?–200)
CO2 SERPL-SCNC: 27 MMOL/L (ref 21–32)
CREAT SERPL-MCNC: 0.89 MG/DL (ref 0.6–1.3)
CREAT UR-MCNC: 58.7 MG/DL
EOSINOPHIL # BLD AUTO: 0.1 THOUSAND/ΜL (ref 0–0.61)
EOSINOPHIL NFR BLD AUTO: 2 % (ref 0–6)
ERYTHROCYTE [DISTWIDTH] IN BLOOD BY AUTOMATED COUNT: 13.5 % (ref 11.6–15.1)
GFR SERPL CREATININE-BSD FRML MDRD: 100 ML/MIN/1.73SQ M
GLUCOSE P FAST SERPL-MCNC: 97 MG/DL (ref 65–99)
HCT VFR BLD AUTO: 49.6 % (ref 36.5–49.3)
HDLC SERPL-MCNC: 41 MG/DL
HGB BLD-MCNC: 17.1 G/DL (ref 12–17)
IMM GRANULOCYTES # BLD AUTO: 0.02 THOUSAND/UL (ref 0–0.2)
IMM GRANULOCYTES NFR BLD AUTO: 0 % (ref 0–2)
LDLC SERPL CALC-MCNC: 62 MG/DL (ref 0–100)
LYMPHOCYTES # BLD AUTO: 1.53 THOUSANDS/ΜL (ref 0.6–4.47)
LYMPHOCYTES NFR BLD AUTO: 23 % (ref 14–44)
MCH RBC QN AUTO: 31.3 PG (ref 26.8–34.3)
MCHC RBC AUTO-ENTMCNC: 34.5 G/DL (ref 31.4–37.4)
MCV RBC AUTO: 91 FL (ref 82–98)
MICROALBUMIN UR-MCNC: <7 MG/L
MONOCYTES # BLD AUTO: 0.52 THOUSAND/ΜL (ref 0.17–1.22)
MONOCYTES NFR BLD AUTO: 8 % (ref 4–12)
NEUTROPHILS # BLD AUTO: 4.4 THOUSANDS/ΜL (ref 1.85–7.62)
NEUTS SEG NFR BLD AUTO: 66 % (ref 43–75)
NRBC BLD AUTO-RTO: 0 /100 WBCS
PLATELET # BLD AUTO: 248 THOUSANDS/UL (ref 149–390)
PMV BLD AUTO: 11 FL (ref 8.9–12.7)
POTASSIUM SERPL-SCNC: 4.1 MMOL/L (ref 3.5–5.3)
PROT SERPL-MCNC: 7.6 G/DL (ref 6.4–8.4)
RBC # BLD AUTO: 5.46 MILLION/UL (ref 3.88–5.62)
SODIUM SERPL-SCNC: 136 MMOL/L (ref 135–147)
TRIGL SERPL-MCNC: 136 MG/DL (ref ?–150)
TSH SERPL DL<=0.05 MIU/L-ACNC: 1.05 UIU/ML (ref 0.45–4.5)
WBC # BLD AUTO: 6.61 THOUSAND/UL (ref 4.31–10.16)

## 2024-12-08 LAB
EST. AVERAGE GLUCOSE BLD GHB EST-MCNC: 123 MG/DL
HBA1C MFR BLD: 5.9 %

## 2024-12-11 ENCOUNTER — RESULTS FOLLOW-UP (OUTPATIENT)
Dept: FAMILY MEDICINE CLINIC | Facility: CLINIC | Age: 49
End: 2024-12-11

## 2024-12-21 DIAGNOSIS — I10 ESSENTIAL HYPERTENSION: ICD-10-CM

## 2024-12-23 RX ORDER — LOSARTAN POTASSIUM 25 MG/1
25 TABLET ORAL DAILY
Qty: 30 TABLET | Refills: 0 | Status: SHIPPED | OUTPATIENT
Start: 2024-12-23

## 2024-12-29 DIAGNOSIS — E11.9 TYPE 2 DIABETES MELLITUS WITHOUT COMPLICATION, WITHOUT LONG-TERM CURRENT USE OF INSULIN (HCC): ICD-10-CM

## 2024-12-31 RX ORDER — METFORMIN HYDROCHLORIDE 500 MG/1
1000 TABLET, EXTENDED RELEASE ORAL DAILY
Qty: 180 TABLET | Refills: 1 | Status: SHIPPED | OUTPATIENT
Start: 2024-12-31

## 2025-01-06 ENCOUNTER — OFFICE VISIT (OUTPATIENT)
Dept: FAMILY MEDICINE CLINIC | Facility: CLINIC | Age: 50
End: 2025-01-06
Payer: COMMERCIAL

## 2025-01-06 ENCOUNTER — PATIENT MESSAGE (OUTPATIENT)
Dept: FAMILY MEDICINE CLINIC | Facility: CLINIC | Age: 50
End: 2025-01-06

## 2025-01-06 VITALS
DIASTOLIC BLOOD PRESSURE: 82 MMHG | BODY MASS INDEX: 28.73 KG/M2 | RESPIRATION RATE: 16 BRPM | WEIGHT: 205.2 LBS | HEIGHT: 71 IN | TEMPERATURE: 97.9 F | HEART RATE: 86 BPM | OXYGEN SATURATION: 99 % | SYSTOLIC BLOOD PRESSURE: 132 MMHG

## 2025-01-06 DIAGNOSIS — Z12.5 PROSTATE CANCER SCREENING: ICD-10-CM

## 2025-01-06 DIAGNOSIS — Z00.01 ABNORMAL WELLNESS EXAM: ICD-10-CM

## 2025-01-06 DIAGNOSIS — E11.9 TYPE 2 DIABETES MELLITUS WITHOUT COMPLICATION, WITHOUT LONG-TERM CURRENT USE OF INSULIN (HCC): Primary | ICD-10-CM

## 2025-01-06 DIAGNOSIS — E78.5 HYPERLIPIDEMIA, UNSPECIFIED HYPERLIPIDEMIA TYPE: ICD-10-CM

## 2025-01-06 DIAGNOSIS — J45.20 MILD INTERMITTENT ASTHMA WITHOUT COMPLICATION: ICD-10-CM

## 2025-01-06 DIAGNOSIS — E11.9 TYPE 2 DIABETES MELLITUS WITHOUT COMPLICATION, WITHOUT LONG-TERM CURRENT USE OF INSULIN (HCC): ICD-10-CM

## 2025-01-06 DIAGNOSIS — I10 ESSENTIAL HYPERTENSION: ICD-10-CM

## 2025-01-06 PROCEDURE — 99396 PREV VISIT EST AGE 40-64: CPT | Performed by: FAMILY MEDICINE

## 2025-01-06 PROCEDURE — 99214 OFFICE O/P EST MOD 30 MIN: CPT | Performed by: FAMILY MEDICINE

## 2025-01-06 NOTE — ASSESSMENT & PLAN NOTE
- Well-controlled on Crestor. Continue same.  - Discussed low fat diet and regular exercise.  - Will continue to monitor fasting lipid profile.

## 2025-01-06 NOTE — ASSESSMENT & PLAN NOTE
- Well-controlled on metformin and Mounjaro. Continue same.  - Discussed low carb diet and regular exercise.  - Will continue to monitor A1C.   Lab Results   Component Value Date    HGBA1C 5.9 (H) 12/06/2024       Orders:  •  Albumin / creatinine urine ratio; Future  •  Comprehensive metabolic panel; Future  •  Hemoglobin A1C; Future  •  CBC and differential; Future  •  Lipid Panel with Direct LDL reflex; Future  •  TSH, 3rd generation with Free T4 reflex; Future

## 2025-01-06 NOTE — PROGRESS NOTES
Name: Aaron Harris      : 1975      MRN: [id number]  Encounter Provider: Sharee Mulligan MD  Encounter Date: 2025   [facility]  :  Assessment & Plan  Type 2 diabetes mellitus without complication, without long-term current use of insulin (HCC)  - Well-controlled on metformin and Mounjaro. Continue same.  - Discussed low carb diet and regular exercise.  - Will continue to monitor A1C.   Lab Results   Component Value Date    HGBA1C 5.9 (H) 2024       Orders:  •  Albumin / creatinine urine ratio; Future  •  Comprehensive metabolic panel; Future  •  Hemoglobin A1C; Future  •  CBC and differential; Future  •  Lipid Panel with Direct LDL reflex; Future  •  TSH, 3rd generation with Free T4 reflex; Future    Prostate cancer screening    Orders:  •  PSA, Total Screen; Future    Mild intermittent asthma without complication  - Stable on albuterol inhaler as needed.  - Will continue to monitor.        Essential hypertension  - Stable on losartan. Continue same.  - Will continue to monitor.        Hyperlipidemia, unspecified hyperlipidemia type  - Well-controlled on Crestor. Continue same.  - Discussed low fat diet and regular exercise.  - Will continue to monitor fasting lipid profile.        Abnormal wellness exam  - It was discussed about immunizations, nutrition and safety concerns.               History of Present Illness     Patient with PMH of type 2 diabetes, HTN, hyperlipidemia presenting to the office for follow up of multiple medical problems. He has been taking his medications regularly with no reported concerns. He is on Mounjaro 7.5mg weekly and has been maintaining his weight. He feels good at where his weight is. His labs were reviewed and A1C improved to 5.9% and other labs were stable. He has no other specific concerns for today's visit.     Hyperlipidemia  Pertinent negatives include no chest pain or shortness of breath.  "  Hypertension  Pertinent negatives include no chest pain, headaches, palpitations or shortness of breath.   Diabetes  Pertinent negatives for hypoglycemia include no dizziness or headaches. Pertinent negatives for diabetes include no chest pain and no fatigue.     Review of Systems   Constitutional:  Negative for chills, fatigue and fever.   HENT:  Negative for congestion, ear pain and sore throat.    Eyes:  Negative for pain.   Respiratory:  Negative for cough and shortness of breath.    Cardiovascular:  Negative for chest pain and palpitations.   Gastrointestinal:  Negative for abdominal pain, nausea and vomiting.   Genitourinary:  Negative for dysuria.   Musculoskeletal:  Negative for arthralgias and back pain.   Skin:  Negative for rash.   Neurological:  Negative for dizziness and headaches.       Objective   /82 (BP Location: Left arm, Patient Position: Sitting, Cuff Size: Large)   Pulse 86   Temp 97.9 °F (36.6 °C) (Tympanic)   Resp 16   Ht 5' 11\" (1.803 m)   Wt 93.1 kg (205 lb 3.2 oz)   SpO2 99%   BMI 28.62 kg/m²      Physical Exam  Vitals and nursing note reviewed.   Constitutional:       General: He is not in acute distress.     Appearance: He is well-developed.   HENT:      Head: Normocephalic and atraumatic.   Eyes:      Conjunctiva/sclera: Conjunctivae normal.   Cardiovascular:      Rate and Rhythm: Normal rate and regular rhythm.      Pulses: no weak pulses.           Dorsalis pedis pulses are 2+ on the right side and 2+ on the left side.      Heart sounds: No murmur heard.  Pulmonary:      Effort: Pulmonary effort is normal. No respiratory distress.      Breath sounds: Normal breath sounds.   Abdominal:      Palpations: Abdomen is soft.      Tenderness: There is no abdominal tenderness.   Musculoskeletal:         General: No swelling.   Feet:      Right foot:      Skin integrity: No ulcer, skin breakdown, erythema, warmth, callus or dry skin.      Left foot:      Skin integrity: No ulcer, " skin breakdown, erythema, warmth, callus or dry skin.   Skin:     General: Skin is warm and dry.   Neurological:      Mental Status: He is alert.   Psychiatric:         Mood and Affect: Mood normal.     Patient's shoes and socks removed.    Right Foot/Ankle   Right Foot Inspection  Skin Exam: skin normal and skin intact. No dry skin, no warmth, no callus, no erythema, no maceration, no abnormal color, no pre-ulcer, no ulcer and no callus.     Toe Exam: ROM and strength within normal limits.     Sensory   Monofilament testing: intact    Vascular  Capillary refills: < 3 seconds  The right DP pulse is 2+.     Left Foot/Ankle  Left Foot Inspection  Skin Exam: skin normal and skin intact. No dry skin, no warmth, no erythema, no maceration, normal color, no pre-ulcer, no ulcer and no callus.     Toe Exam: ROM and strength within normal limits.     Sensory   Monofilament testing: intact    Vascular  Capillary refills: < 3 seconds  The left DP pulse is 2+.     Assign Risk Category  No deformity present  No loss of protective sensation  No weak pulses  Risk: 0

## 2025-01-07 NOTE — PATIENT COMMUNICATION
Pt is requesting a 90 day supply of mounjaro and is currently on the mounjaro 7.5 mg weekly.    Please advise

## 2025-01-14 DIAGNOSIS — I10 ESSENTIAL HYPERTENSION: ICD-10-CM

## 2025-01-15 RX ORDER — LOSARTAN POTASSIUM 25 MG/1
25 TABLET ORAL DAILY
Qty: 90 TABLET | Refills: 1 | Status: SHIPPED | OUTPATIENT
Start: 2025-01-15

## 2025-04-02 DIAGNOSIS — E11.9 TYPE 2 DIABETES MELLITUS WITHOUT COMPLICATION, WITHOUT LONG-TERM CURRENT USE OF INSULIN (HCC): ICD-10-CM

## 2025-04-03 RX ORDER — TIRZEPATIDE 7.5 MG/.5ML
INJECTION, SOLUTION SUBCUTANEOUS
Qty: 6 ML | Refills: 0 | Status: SHIPPED | OUTPATIENT
Start: 2025-04-03

## 2025-06-29 DIAGNOSIS — E11.9 TYPE 2 DIABETES MELLITUS WITHOUT COMPLICATION, WITHOUT LONG-TERM CURRENT USE OF INSULIN (HCC): ICD-10-CM

## 2025-07-01 RX ORDER — METFORMIN HYDROCHLORIDE 500 MG/1
1000 TABLET, EXTENDED RELEASE ORAL DAILY
Qty: 60 TABLET | Refills: 0 | Status: SHIPPED | OUTPATIENT
Start: 2025-07-01

## 2025-07-02 ENCOUNTER — APPOINTMENT (OUTPATIENT)
Dept: LAB | Facility: IMAGING CENTER | Age: 50
End: 2025-07-02
Payer: COMMERCIAL

## 2025-07-02 DIAGNOSIS — Z12.5 PROSTATE CANCER SCREENING: ICD-10-CM

## 2025-07-02 DIAGNOSIS — E11.9 TYPE 2 DIABETES MELLITUS WITHOUT COMPLICATION, WITHOUT LONG-TERM CURRENT USE OF INSULIN (HCC): ICD-10-CM

## 2025-07-02 LAB
ALBUMIN SERPL BCG-MCNC: 4.4 G/DL (ref 3.5–5)
ALP SERPL-CCNC: 56 U/L (ref 34–104)
ALT SERPL W P-5'-P-CCNC: 20 U/L (ref 7–52)
ANION GAP SERPL CALCULATED.3IONS-SCNC: 10 MMOL/L (ref 4–13)
AST SERPL W P-5'-P-CCNC: 17 U/L (ref 13–39)
BASOPHILS # BLD AUTO: 0.04 THOUSANDS/ÂΜL (ref 0–0.1)
BASOPHILS NFR BLD AUTO: 1 % (ref 0–1)
BILIRUB SERPL-MCNC: 0.83 MG/DL (ref 0.2–1)
BUN SERPL-MCNC: 11 MG/DL (ref 5–25)
CALCIUM SERPL-MCNC: 9.4 MG/DL (ref 8.4–10.2)
CHLORIDE SERPL-SCNC: 104 MMOL/L (ref 96–108)
CHOLEST SERPL-MCNC: 186 MG/DL (ref ?–200)
CO2 SERPL-SCNC: 27 MMOL/L (ref 21–32)
CREAT SERPL-MCNC: 0.93 MG/DL (ref 0.6–1.3)
CREAT UR-MCNC: 174.5 MG/DL
EOSINOPHIL # BLD AUTO: 0.15 THOUSAND/ÂΜL (ref 0–0.61)
EOSINOPHIL NFR BLD AUTO: 3 % (ref 0–6)
ERYTHROCYTE [DISTWIDTH] IN BLOOD BY AUTOMATED COUNT: 13.3 % (ref 11.6–15.1)
EST. AVERAGE GLUCOSE BLD GHB EST-MCNC: 128 MG/DL
GFR SERPL CREATININE-BSD FRML MDRD: 96 ML/MIN/1.73SQ M
GLUCOSE P FAST SERPL-MCNC: 103 MG/DL (ref 65–99)
HBA1C MFR BLD: 6.1 %
HCT VFR BLD AUTO: 45.7 % (ref 36.5–49.3)
HDLC SERPL-MCNC: 39 MG/DL
HGB BLD-MCNC: 15.5 G/DL (ref 12–17)
IMM GRANULOCYTES # BLD AUTO: 0.01 THOUSAND/UL (ref 0–0.2)
IMM GRANULOCYTES NFR BLD AUTO: 0 % (ref 0–2)
LDLC SERPL CALC-MCNC: 125 MG/DL (ref 0–100)
LYMPHOCYTES # BLD AUTO: 1.35 THOUSANDS/ÂΜL (ref 0.6–4.47)
LYMPHOCYTES NFR BLD AUTO: 28 % (ref 14–44)
MCH RBC QN AUTO: 30.4 PG (ref 26.8–34.3)
MCHC RBC AUTO-ENTMCNC: 33.9 G/DL (ref 31.4–37.4)
MCV RBC AUTO: 90 FL (ref 82–98)
MICROALBUMIN UR-MCNC: <7 MG/L
MONOCYTES # BLD AUTO: 0.4 THOUSAND/ÂΜL (ref 0.17–1.22)
MONOCYTES NFR BLD AUTO: 8 % (ref 4–12)
NEUTROPHILS # BLD AUTO: 2.93 THOUSANDS/ÂΜL (ref 1.85–7.62)
NEUTS SEG NFR BLD AUTO: 60 % (ref 43–75)
NRBC BLD AUTO-RTO: 0 /100 WBCS
PLATELET # BLD AUTO: 205 THOUSANDS/UL (ref 149–390)
PMV BLD AUTO: 10.6 FL (ref 8.9–12.7)
POTASSIUM SERPL-SCNC: 4.3 MMOL/L (ref 3.5–5.3)
PROT SERPL-MCNC: 7 G/DL (ref 6.4–8.4)
PSA SERPL-MCNC: 0.37 NG/ML (ref 0–4)
RBC # BLD AUTO: 5.1 MILLION/UL (ref 3.88–5.62)
SODIUM SERPL-SCNC: 141 MMOL/L (ref 135–147)
TRIGL SERPL-MCNC: 110 MG/DL (ref ?–150)
TSH SERPL DL<=0.05 MIU/L-ACNC: 1.32 UIU/ML (ref 0.45–4.5)
WBC # BLD AUTO: 4.88 THOUSAND/UL (ref 4.31–10.16)

## 2025-07-02 PROCEDURE — 83036 HEMOGLOBIN GLYCOSYLATED A1C: CPT

## 2025-07-02 PROCEDURE — G0103 PSA SCREENING: HCPCS

## 2025-07-02 PROCEDURE — 36415 COLL VENOUS BLD VENIPUNCTURE: CPT

## 2025-07-02 PROCEDURE — 80061 LIPID PANEL: CPT

## 2025-07-02 PROCEDURE — 82043 UR ALBUMIN QUANTITATIVE: CPT

## 2025-07-02 PROCEDURE — 84443 ASSAY THYROID STIM HORMONE: CPT

## 2025-07-02 PROCEDURE — 82570 ASSAY OF URINE CREATININE: CPT

## 2025-07-02 PROCEDURE — 85025 COMPLETE CBC W/AUTO DIFF WBC: CPT

## 2025-07-02 PROCEDURE — 80053 COMPREHEN METABOLIC PANEL: CPT

## 2025-07-07 ENCOUNTER — OFFICE VISIT (OUTPATIENT)
Dept: FAMILY MEDICINE CLINIC | Facility: CLINIC | Age: 50
End: 2025-07-07
Payer: COMMERCIAL

## 2025-07-07 VITALS
SYSTOLIC BLOOD PRESSURE: 136 MMHG | HEIGHT: 71 IN | DIASTOLIC BLOOD PRESSURE: 84 MMHG | TEMPERATURE: 97.8 F | BODY MASS INDEX: 28.53 KG/M2 | RESPIRATION RATE: 16 BRPM | OXYGEN SATURATION: 99 % | HEART RATE: 81 BPM | WEIGHT: 203.8 LBS

## 2025-07-07 DIAGNOSIS — M77.01 GOLFER'S ELBOW, RIGHT: ICD-10-CM

## 2025-07-07 DIAGNOSIS — E11.9 TYPE 2 DIABETES MELLITUS WITHOUT COMPLICATION, WITHOUT LONG-TERM CURRENT USE OF INSULIN (HCC): Primary | ICD-10-CM

## 2025-07-07 DIAGNOSIS — I10 ESSENTIAL HYPERTENSION: ICD-10-CM

## 2025-07-07 DIAGNOSIS — E78.5 HYPERLIPIDEMIA, UNSPECIFIED HYPERLIPIDEMIA TYPE: ICD-10-CM

## 2025-07-07 PROCEDURE — 99214 OFFICE O/P EST MOD 30 MIN: CPT | Performed by: FAMILY MEDICINE

## 2025-07-07 NOTE — ASSESSMENT & PLAN NOTE
Not well-controlled.  Discussed with patient to take his Crestor daily as prescribed.  Will continue to monitor fasting lipid profile.

## 2025-07-07 NOTE — PROGRESS NOTES
Name: Aaron Harris      : 1975      MRN: [id number]  Encounter Provider: Sharee Mulligan MD  Encounter Date: 2025   Encounter department: [facility]  :  Assessment & Plan  Type 2 diabetes mellitus without complication, without long-term current use of insulin (HCC)  - Well-controlled on metformin and Mounjaro. Continue same.  - Discussed low carb diet and regular exercise.  - Will continue to monitor A1C.   Lab Results   Component Value Date    HGBA1C 6.1 2024               Orders:  •  Albumin / creatinine urine ratio; Future  •  Comprehensive metabolic panel; Future  •  Hemoglobin A1C; Future  •  CBC and differential; Future  •  Lipid Panel with Direct LDL reflex; Future  •  TSH, 3rd generation with Free T4 reflex; Future    Essential hypertension  Well-controlled on losartan 25 mg daily.  Continue same.  We will continue to monitor.       Hyperlipidemia, unspecified hyperlipidemia type  Not well-controlled.  Discussed with patient to take his Crestor daily as prescribed.  Will continue to monitor fasting lipid profile.       Golfer's elbow, right  He was told to use Aleve twice a day and apply ice.  Avoid movement that triggered the pain.  If not improving call or come back.              History of Present Illness   He is here today for follow-up multiple medical problems.  He has been taking his medications.  Denies any side effect with medication.  He had blood work done recently showed A1c well-controlled 6.1.  His cholesterol came back elevated.  He has not been taking his Crestor as prescribed.  All the rest of blood work came back stable.  He complained of pain in his right elbow started few weeks ago and has not been getting any worse.  He denies any recent history of injury or trauma.    Diabetes  Pertinent negatives for hypoglycemia include no dizziness or headaches. Pertinent negatives for diabetes include no chest pain.   Hyperlipidemia  Pertinent  "negatives include no chest pain or shortness of breath.   Hypertension  Pertinent negatives include no chest pain, headaches, palpitations or shortness of breath.   Elbow Pain  Pertinent negatives include no abdominal pain, chest pain, chills, coughing, fever, headaches, rash or vomiting.     Review of Systems   Constitutional:  Negative for chills and fever.   HENT:  Negative for trouble swallowing.    Eyes:  Negative for visual disturbance.   Respiratory:  Negative for cough and shortness of breath.    Cardiovascular:  Negative for chest pain and palpitations.   Gastrointestinal:  Negative for abdominal pain, blood in stool and vomiting.   Endocrine: Negative for cold intolerance and heat intolerance.   Genitourinary:  Negative for difficulty urinating and dysuria.   Musculoskeletal:  Negative for gait problem.        Right elbow pain   Skin:  Negative for rash.   Neurological:  Negative for dizziness, syncope and headaches.   Hematological:  Negative for adenopathy.   Psychiatric/Behavioral:  Negative for behavioral problems.        Objective   /84 (BP Location: Left arm, Patient Position: Sitting, Cuff Size: Large)   Pulse 81   Temp 97.8 °F (36.6 °C) (Tympanic)   Resp 16   Ht 5' 11\" (1.803 m)   Wt 92.4 kg (203 lb 12.8 oz)   SpO2 99%   BMI 28.42 kg/m²      Physical Exam  Vitals and nursing note reviewed.   Constitutional:       Appearance: He is well-developed.   HENT:      Head: Normocephalic and atraumatic.     Eyes:      Pupils: Pupils are equal, round, and reactive to light.       Cardiovascular:      Rate and Rhythm: Normal rate and regular rhythm.      Pulses: no weak pulses.           Dorsalis pedis pulses are 2+ on the right side and 2+ on the left side.      Heart sounds: Normal heart sounds.   Pulmonary:      Effort: Pulmonary effort is normal.      Breath sounds: Normal breath sounds.   Abdominal:      General: Bowel sounds are normal.      Palpations: Abdomen is soft.     Musculoskeletal: "         General: Tenderness (Tenderness to palpation over the medial aspect of the right elbow.) present.      Cervical back: Normal range of motion and neck supple.   Feet:      Right foot:      Skin integrity: No ulcer, skin breakdown, erythema, warmth, callus or dry skin.      Left foot:      Skin integrity: No ulcer, skin breakdown, erythema, warmth, callus or dry skin.   Lymphadenopathy:      Cervical: No cervical adenopathy.     Skin:     General: Skin is warm.      Findings: No rash.     Neurological:      Mental Status: He is alert and oriented to person, place, and time.               Diabetic Foot Exam    Patient's shoes and socks removed.    Right Foot/Ankle   Right Foot Inspection  Skin Exam: skin normal and skin intact. No dry skin, no warmth, no callus, no erythema, no maceration, no abnormal color, no pre-ulcer, no ulcer and no callus.     Toe Exam: ROM and strength within normal limits.     Sensory   Monofilament testing: intact    Vascular  Capillary refills: < 3 seconds  The right DP pulse is 2+.     Left Foot/Ankle  Left Foot Inspection  Skin Exam: skin normal and skin intact. No dry skin, no warmth, no erythema, no maceration, normal color, no pre-ulcer, no ulcer and no callus.     Toe Exam: ROM and strength within normal limits.     Sensory   Monofilament testing: intact    Vascular  Capillary refills: < 3 seconds  The left DP pulse is 2+.     Assign Risk Category  No deformity present  No loss of protective sensation  No weak pulses  Risk: 0

## 2025-07-07 NOTE — ASSESSMENT & PLAN NOTE
He was told to use Aleve twice a day and apply ice.  Avoid movement that triggered the pain.  If not improving call or come back.

## 2025-07-07 NOTE — ASSESSMENT & PLAN NOTE
- Well-controlled on metformin and Mounjaro. Continue same.  - Discussed low carb diet and regular exercise.  - Will continue to monitor A1C.   Lab Results   Component Value Date    HGBA1C 6.1 12/06/2024               Orders:  •  Albumin / creatinine urine ratio; Future  •  Comprehensive metabolic panel; Future  •  Hemoglobin A1C; Future  •  CBC and differential; Future  •  Lipid Panel with Direct LDL reflex; Future  •  TSH, 3rd generation with Free T4 reflex; Future

## 2025-07-11 DIAGNOSIS — E78.2 MIXED HYPERLIPIDEMIA: ICD-10-CM

## 2025-07-12 RX ORDER — ROSUVASTATIN CALCIUM 10 MG/1
10 TABLET, COATED ORAL DAILY
Qty: 90 TABLET | Refills: 1 | Status: SHIPPED | OUTPATIENT
Start: 2025-07-12

## 2025-07-18 DIAGNOSIS — E11.9 TYPE 2 DIABETES MELLITUS WITHOUT COMPLICATION, WITHOUT LONG-TERM CURRENT USE OF INSULIN (HCC): ICD-10-CM

## 2025-07-18 RX ORDER — TIRZEPATIDE 7.5 MG/.5ML
INJECTION, SOLUTION SUBCUTANEOUS
Qty: 2 ML | Refills: 5 | Status: SHIPPED | OUTPATIENT
Start: 2025-07-18

## 2025-07-23 DIAGNOSIS — E11.9 TYPE 2 DIABETES MELLITUS WITHOUT COMPLICATION, WITHOUT LONG-TERM CURRENT USE OF INSULIN (HCC): ICD-10-CM

## 2025-07-24 RX ORDER — METFORMIN HYDROCHLORIDE 500 MG/1
1000 TABLET, EXTENDED RELEASE ORAL DAILY
Qty: 180 TABLET | Refills: 1 | Status: SHIPPED | OUTPATIENT
Start: 2025-07-24